# Patient Record
Sex: MALE | Race: WHITE | NOT HISPANIC OR LATINO | Employment: OTHER | URBAN - METROPOLITAN AREA
[De-identification: names, ages, dates, MRNs, and addresses within clinical notes are randomized per-mention and may not be internally consistent; named-entity substitution may affect disease eponyms.]

---

## 2021-03-30 ENCOUNTER — OFFICE VISIT (OUTPATIENT)
Dept: OPHTHALMOLOGY | Facility: CLINIC | Age: 61
End: 2021-03-30
Payer: COMMERCIAL

## 2021-03-30 VITALS — BODY MASS INDEX: 21.7 KG/M2 | HEIGHT: 71 IN | WEIGHT: 155 LBS

## 2021-03-30 DIAGNOSIS — H35.89 RETINAL MACULAR ATROPHY: Primary | ICD-10-CM

## 2021-03-30 DIAGNOSIS — H25.093 AGE-RELATED INCIPIENT CATARACT OF BOTH EYES: ICD-10-CM

## 2021-03-30 PROCEDURE — 92004 COMPRE OPH EXAM NEW PT 1/>: CPT | Performed by: OPTOMETRIST

## 2021-03-30 PROCEDURE — 92134 CPTRZ OPH DX IMG PST SGM RTA: CPT | Performed by: OPTOMETRIST

## 2021-03-30 ASSESSMENT — REFRACTION_WEARINGRX
OS_AXIS: 173
SPECS_TYPE: PAL
OD_AXIS: 173
OS_SPHERE: -5.75
OD_ADD: +2.25
OS_ADD: +2.25
OD_CYLINDER: +1.00
OS_CYLINDER: +1.00
OD_SPHERE: -5.25

## 2021-03-30 ASSESSMENT — REFRACTION_MANIFEST
OD_ADD: +2.50
OD_CYLINDER: +1.25
OS_SPHERE: -5.25
OD_SPHERE: -5.00
OS_AXIS: 175
OD_AXIS: 172
OS_CYLINDER: +1.25
OS_ADD: +2.50

## 2021-03-30 ASSESSMENT — CONF VISUAL FIELD
METHOD: COUNTING FINGERS
OD_NORMAL: 1
OS_NORMAL: 1

## 2021-03-30 ASSESSMENT — VISUAL ACUITY
OD_CC+: -1
OD_CC: J7
OS_CC: J7
OS_CC+: -1
CORRECTION_TYPE: GLASSES
OD_CC: 20/30
METHOD: SNELLEN - LINEAR
OS_CC: 20/40

## 2021-03-30 ASSESSMENT — TONOMETRY
IOP_METHOD: TONOPEN
OD_IOP_MMHG: 15
OS_IOP_MMHG: 16

## 2021-03-30 ASSESSMENT — EXTERNAL EXAM - RIGHT EYE: OD_EXAM: NORMAL

## 2021-03-30 ASSESSMENT — EXTERNAL EXAM - LEFT EYE: OS_EXAM: NORMAL

## 2021-03-30 ASSESSMENT — SLIT LAMP EXAM - LIDS
COMMENTS: NORMAL
COMMENTS: NORMAL

## 2021-03-30 ASSESSMENT — MIFFLIN-ST. JEOR: SCORE: 1535.21

## 2021-03-30 ASSESSMENT — CUP TO DISC RATIO
OS_RATIO: 0.5
OD_RATIO: 0.6

## 2021-03-30 NOTE — PROGRESS NOTES
History  HPI     Annual Eye Exam     In both eyes.  Associated symptoms include tearing.  Negative for glare, haloes and eye pain.  Treatments tried include no treatments.  Pain was noted as 0/10. Additional comments: Vision decline with each eye.               Comments     The past 2 year vision has declined with each eye. Had an exam 2 week ago and a Cataract Evaluation was recommended. Patient states is having having a hard time night driving not due to glare or light it just due to the dim vision with each eye. Tearing with left eye > right eye the past year with no pattern.     Alana Billy, COT COT 2:16 PM March 30, 2021     The patient was seen by Dr. Lee at [facility] on 3/18/2021. He was told he had cataracts, but the doctor was not sure that these were limiting his vision.            Last edited by Iker Millan, OD on 3/30/2021  3:05 PM. (History)          Assessment/Plan  (H35.89) Retinal macular atrophy  (primary encounter diagnosis)  Comment:    Reporting decreased acuity and dim vision especially at night, denies significant glare   Mild cataracts on examination   Diffuse retinal thinning on RNFL OCT  Plan: OCT Retina Spectralis OU (both eyes)         Educated patient and wife on clinical findings. Referred to Dr. Stokes for retina consultation. Given unknown etiology, explained that prognosis at this time is unknown.    (H25.093) Age-related incipient cataract of both eyes  Comment: Not visually significant  Plan:  Explained that cataract surgery would not be beneficial at this time.    Complete documentation of historical and exam elements from today's encounter can  be found in the full encounter summary report (not reduplicated in this progress  note). I personally obtained the chief complaint(s) and history of present illness. I  confirmed and edited as necessary the review of systems, past medical/surgical  history, family history, social history, and examination findings as  documented by  others; and I examined the patient myself. I personally reviewed the relevant tests,  images, and reports as documented above. I formulated and edited as necessary the  assessment and plan and discussed the findings and management plan with the  patient and family.    Iker Millan OD, FAAO

## 2021-03-30 NOTE — NURSING NOTE
Chief Complaints and History of Present Illnesses   Patient presents with     Annual Eye Exam     Vision decline with each eye.      Chief Complaint(s) and History of Present Illness(es)     Annual Eye Exam     Laterality: both eyes    Associated symptoms: tearing.  Negative for glare, haloes and eye pain    Treatments tried: no treatments    Pain scale: 0/10    Comments: Vision decline with each eye.               Comments     The past 2 year vision has declined with each eye. Had an exam 2 week ago and a Cataract Evaluation was recommended. Patient states is having having a hard time night driving not due to glare or light it just due to the dim vision with each eye. Tearing with left eye > right eye the past year with no pattern.     Alana Billy, COT COT 2:16 PM March 30, 2021                   \

## 2021-04-05 DIAGNOSIS — H35.89 RETINAL MACULAR ATROPHY: Primary | ICD-10-CM

## 2021-04-13 ENCOUNTER — OFFICE VISIT (OUTPATIENT)
Dept: OPHTHALMOLOGY | Facility: CLINIC | Age: 61
End: 2021-04-13
Attending: OPHTHALMOLOGY
Payer: COMMERCIAL

## 2021-04-13 DIAGNOSIS — H35.89 RETINAL MACULAR ATROPHY: Primary | ICD-10-CM

## 2021-04-13 DIAGNOSIS — H35.89 RETINAL MACULAR ATROPHY: ICD-10-CM

## 2021-04-13 DIAGNOSIS — H35.50 RETINAL DYSTROPHY: Primary | ICD-10-CM

## 2021-04-13 PROCEDURE — 92004 COMPRE OPH EXAM NEW PT 1/>: CPT | Mod: GC | Performed by: OPHTHALMOLOGY

## 2021-04-13 PROCEDURE — 99207 FUNDUS AUTOFLUORESCENCE IMAGE (FAF) OU (BOTH EYES): CPT | Performed by: OPHTHALMOLOGY

## 2021-04-13 PROCEDURE — G0463 HOSPITAL OUTPT CLINIC VISIT: HCPCS

## 2021-04-13 PROCEDURE — 92133 CPTRZD OPH DX IMG PST SGM ON: CPT | Performed by: OPHTHALMOLOGY

## 2021-04-13 PROCEDURE — 92250 FUNDUS PHOTOGRAPHY W/I&R: CPT | Performed by: OPHTHALMOLOGY

## 2021-04-13 PROCEDURE — 92134 CPTRZ OPH DX IMG PST SGM RTA: CPT | Performed by: OPHTHALMOLOGY

## 2021-04-13 SDOH — HEALTH STABILITY: MENTAL HEALTH: HOW OFTEN DO YOU HAVE A DRINK CONTAINING ALCOHOL?: NEVER

## 2021-04-13 ASSESSMENT — VISUAL ACUITY
OD_CC: 20/25
OD_CC+: -2
METHOD: SNELLEN - LINEAR
OS_CC: 20/40

## 2021-04-13 ASSESSMENT — REFRACTION_WEARINGRX
SPECS_TYPE: PAL
OS_ADD: +2.25
OD_CYLINDER: +1.00
OD_AXIS: 173
OD_SPHERE: -5.25
OD_ADD: +2.25
OS_CYLINDER: +1.00
OS_AXIS: 173
OS_SPHERE: -5.75

## 2021-04-13 ASSESSMENT — EXTERNAL EXAM - RIGHT EYE: OD_EXAM: NORMAL

## 2021-04-13 ASSESSMENT — TONOMETRY
OS_IOP_MMHG: 16
IOP_METHOD: TONOPEN
OD_IOP_MMHG: 15

## 2021-04-13 ASSESSMENT — CONF VISUAL FIELD
METHOD: COUNTING FINGERS
OS_NORMAL: 1
OD_NORMAL: 1

## 2021-04-13 ASSESSMENT — CUP TO DISC RATIO
OD_RATIO: 0.5
OS_RATIO: 0.5

## 2021-04-13 ASSESSMENT — SLIT LAMP EXAM - LIDS
COMMENTS: NORMAL
COMMENTS: NORMAL

## 2021-04-13 ASSESSMENT — EXTERNAL EXAM - LEFT EYE: OS_EXAM: NORMAL

## 2021-04-13 NOTE — NURSING NOTE
Chief Complaints and History of Present Illnesses   Patient presents with     Retinal Evaluation     Chief Complaint(s) and History of Present Illness(es)     Retinal Evaluation     Laterality: both eyes    Associated symptoms: floaters, flashes, discharge and tearing (Left eye).  Negative for eye pain, dryness, itching and foreign body sensation    Pain scale: 0/10              Comments     Cecil is here referred by Dr Millan for a retinal evaluation. He says he has had floaters for several years, but recently has been having some flashing lights as well.   At night he has trouble seeing anything.     Kendall Jensen COT 2:17 PM April 13, 2021

## 2021-04-13 NOTE — PROGRESS NOTES
CC -   Retinal macular atrophy    INTERVAL HISTORY - Initial visit.     PMH -   Cecil Haddad is a  60 year old year-old patient referred by Dr Millan for evaluation and treat of retinal macular atrophy.  Noticed ~ 2017 objects were grey or reddish tint, poor color vision, TV images seem washed out  Progressively worsening, trouble seeing with bright sunlight, also trouble in dark.  No peripheral vision problems noticed    Occasional flashes only since ~ 9/2020, has floaters lifelong no changes    No family history of vision loss. Smokes 1 PPD and uses marijuana. Last had alcohol about 12 years ago   Patient takes vitamin supplements  Has had renal CA Tx with resection     No family history known but limited family      PAST OCULAR SURGERY  None    RETINAL IMAGING:  Mac OCT  4-13-21  OD - loss of photoreceptor, PHF attached, thin choroid  OS - oss of photoreceptor, PHF attached, thin choroid    AF 4-13-21  right eye- loss of hypoAF in the fundus  left eye- loss of hypoAF in the fundus      OCT RNFL  4/13/21  OU - normal      ASSESSMENT & PLAN    #  Retinal dystrophy   - most likely etiology of symptoms   - cannot r/o CAR/AIR   - will check ffERG/GVF first    -  NS OU   - mild      # syneresis OU   - advised S/Sx RD 4/2021      Colton Chavez MD  Ophthalmology Resident, PGY-3     return to clinic: 3 months, OCT OU, DFE OU    ATTESTATION     Attending Attestation:     Complete documentation of historical and exam elements from today's encounter can be found in the full encounter summary report (not reduplicated in this progress note).  I personally obtained the chief complaint(s) and history of present illness.  I confirmed and edited as necessary the review of systems, past medical/surgical history, family history, social history, and examination findings as documented by others; and I examined the patient myself.  I personally reviewed the relevant tests, images, and reports as documented above.  I personally  reviewed the ophthalmic test(s) associated with this encounter, agree with the interpretation(s) as documented by the resident/fellow, and have edited the corresponding report(s) as necessary.   I formulated and edited as necessary the assessment and plan and discussed the findings and management plan with the patient and family    Johana Stokes MD, PhD  , Vitreoretinal Surgery  Department of Ophthalmology  [facility]

## 2021-07-05 ENCOUNTER — ALLIED HEALTH/NURSE VISIT (OUTPATIENT)
Dept: OPHTHALMOLOGY | Facility: CLINIC | Age: 61
End: 2021-07-05
Attending: OPHTHALMOLOGY
Payer: COMMERCIAL

## 2021-07-05 DIAGNOSIS — H35.89 RETINAL MACULAR ATROPHY: ICD-10-CM

## 2021-07-05 DIAGNOSIS — H35.50 RETINAL DYSTROPHY: ICD-10-CM

## 2021-07-05 PROCEDURE — 999N000103 HC STATISTIC NO CHARGE FACILITY FEE

## 2021-07-05 ASSESSMENT — VISUAL ACUITY
OD_CC: 20/30
OS_CC: 20/40
OS_CC+: -
CORRECTION_TYPE: GLASSES
METHOD: SNELLEN - LINEAR
OD_CC+: -1

## 2021-07-05 ASSESSMENT — REFRACTION_WEARINGRX
OD_CYLINDER: +1.00
OS_AXIS: 173
OD_AXIS: 173
OS_CYLINDER: +1.00
OS_ADD: +2.25
OD_SPHERE: -5.25
OD_ADD: +2.25
OS_SPHERE: -5.75
SPECS_TYPE: PAL

## 2021-07-05 NOTE — NURSING NOTE
"Returns for GVF+ffERG.  Referred by Dr. Millan to Dr. Stokes for eval of retinal macular atrophy. H/O renal CA tx w/resection.  Last eye exam 04-13-21:  retinal dystrophy, most likely etiology of symptoms, cannot rule out CAR/AIR.  Pt states he is an  ([employer] service, sometimes [place] of [place]  Decreased night vision x1-2yrs and needs increased lighting to see.  Now also w/decresed color vision; used to see colors well but tv looks black/white.  States traffic lights look the same but overall vision is \"off\".  +Light sensitivity.  Scheduled to return to Retina 07-26; will await results.  "

## 2021-07-05 NOTE — PROGRESS NOTES
STANDARD ERG REPORT    Referring: Kike    RE:  Cecil Haddad  MRN:  [id number]  :  1960    ERG Date:  2021      DIAGNOSTIC INDICATION:  The patient is a 60 year old man with suspected retinal dystrophy.  He noticed decreased color vision and trouble in bright sunlight since about , with progressive worsening.  He has not noticed any peripheral vision problems.  The patient has a history of renal cancer treated with resection.    DIAGNOSTIC FINDINGS:    A full field ERG was obtained in both eye using DTL electrodes and ISCEV standards.  Per the technician, the recording was essentially reliable in both eyes.  The results showed severe attenuation in both eyes as detailed below     Under scotopic conditions, both eyes showed severe attenuation.  In particular, the dim white flash response was flat in both eyes and could not be measured.  The bright 3.0 flash showed severe attenuation of both the A and B waves, to less than 10% of normal minimum amplitude, and a moderate 2.7 ms delay of the B-wave.  The bright 10.0 flash was likewise severely attenuated, though in this case the A and B-waves were reduced to less than 20% of the normal minimum amplitude.  Both the A and B waves were delayed.  Oscillatory potentials were visible but markedly attenuated.     Under photopic conditions, both eyes showed moderate attenuation.  The 30Hz flicker showed amplitudes of 30 of normal for the right eye and 50% of normal for the left eye, with delays of 0.6 ms for the right eye and 2.8 ms for the left eye.  The single photopic flash showed similar attenuation and delay, with normal morphology.  The 30Hz flicker is more reliable for numerical measurements of the photopic response.  The RETeval response was also measured, showing moderate reduction and delay of the photopic response, though the RETeval showed more symmetry between the eyes.                         Visual Acuity Right Eye : 20/30-1, NI        w/gls, -5.25 + 1.00x173    Visual Acuity Left Eye : 20/40-, PHNI      w/gls, -5.75 + 1.00x173                                               ALL AVERAGED                   Data for Full-Field ERG  Right Eye  Left Eye   DARK-ADAPTED Patient Normal Patient   0.01 ERG (nicholas) b-wave amplitude ( v) ---- 69.6 to 348.2 ----   0.01 ERG (nicholas) b-wave implicit time (ms) ---- 78.2 to 117.2 ----         3.0 ERG (combined) a-wave amplitude ( v) -13 -259.4 to -98 -11   3.0 ERG (combined) a-wave implicit time (ms) 20 11.5 to 20.3 19.4   3.0 ERG (combined) b-wave amplitude ( v) 24.5 159.2 to 421.6 21   3.0 ERG (combined) b-wave implicit time (ms) 59.9 41.2 to 57.2 59.9         10.0 ERG (brighter combined) a-wave amplitude ( v) -18 -291 to -110 -15   10.0 ERG (brighter combined) a-wave implicit time (ms) 17.2 9.9 to 15.1 16.6   10.0 ERG (brighter combined) b-wave amplitude ( v) 35 191.5 to 403.1 28   10.0 ERG (brighter combined) b-wave implicit time (ms) 64.9 39.1 to 55.3 61.6         3.0 Oscillatory Potentials  present    LIGHT-ADAPTED       3.0 Flicker (30Hz) amplitude ( v) 26(29) 56.4 to 151.2 17(27)   3.0 Flicker (30Hz) implicit time (ms) 32.2(132) 21.8 to 31.6 34.4(99.8         3.0 ERG (cone) a-wave amplitude ( v) -6 -45.1 to -13.7 -6   3.0 ERG (cone) a-wave implicit time (ms) 18.9 11.4 to 16.8 18.9   3.0 ERG (cone) b-wave amplitude ( v) 19 62.4 to 174.2 18   3.0 ERG (cone) b-wave implicit time (ms) 36.1 26.8 to 34.2 37.2   * = manipulated cursors  parentheses = cursors at selected peaks  ---- = residual to non-measurable  xxxx = not tested          STANDARD RETeval ERG REPORT,  ISCEV Photopic Flash/Flicker and PhNR   -- RETeval w/Sensor Strip = 1/3 Espion3 w/DTL                                                                     ALL AVERAGED  Data for Full-Field ERG Right Eye   Left Eye    Dark-Adapted Patient Normal  Patient   0.01 ERG (nicholas) amplitude( v) xxxx 21 to 79 xxxx   0.01 ERG (nicholas) implicit time(ms) xxxx 68 to 103  xxxx   MMMMM      3.0 ERG (combined) a-wave amplitude( v) xxxx -62 to -22 xxxx   3.0 ERG (combined) a-wave implicit time(ms) xxxx 13 to 18 xxxx   3.0 ERG (combined) b-wave amplitude( v) xxxx 42 to 86 xxxx   3.0 ERG (combined) b-wave implicit time(ms) xxxx 35 to 52 xxxx   MMMMM      10.0 ERG (brighter) a-wave amplitude( v) xxxx -77 to -31 xxxx   10.0 ERG (brighter) a-wave implicit time(ms) xxxx 10 to 13 xxxx   10.0 ERG (brighter) b-wave amplitude( v) xxxx 54 to 95 xxxx   10.0 ERG (brighter) b-wave implicit time(ms) xxxx 35 to 53 xxxx         3.0 Oscillatory Potentials xxxx present xxxx    Light-Adapted       3.0 Flicker (30-Hz) amplitude( v) 8.9 16.1 to 53.1 8.8   3.0 Flicker (30-Hz) implicit time(ms) 32.1 24.6 to 29.4 31.9         3.0 ERG (cone) a-wave amplitude( v) -2.1 -16.0 to -2.1 -3.1   3.0 ERG (cone) a-wave implicit time(ms) 15.2 10.5 to 14.7 17.8   3.0 ERG (cone) b-wave amplitude( v) 7.5 16.5 to 64.1 7.6   3.0 ERG (cone) b-wave implicit time(ms) 33.8 27.3 to 32.5 35.4                                   ---- = residual to non-measurable                                xxxx = not tested                    Normative values per  Evaluation of light- and dark-adapted ERGs using a mydriasis-free,                                     portable system: clinical classifications and normative data  by H Navin, X Dorian, S Areli, SN Harris                                          M Kary, BOBBI Kim, GRACIE Toledo ,BOBBI Masterson, SARAH Brown,   Ophthal[employer],                                          [facility], [facility]. [email or web address]                   Normative values per [facility] data per individual age at time of testing, cd (dilated) and Td (undilated).                                        INTERPRETATION:         1. Abnormal ffERG both eyes.  The ffERG shows severe reduction of the nicholas response in both eyes, with moderate reduction of the cone response.   Interestingly, the patients symptoms are more prominent in photopic conditions, and his GVF does not show any perpheral vision loss.  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  The findings are less typical for an autoimmune or cancer-associated retinopathy.    2. Genetic testing is recommended and referral to Dr. Gallego.  A test for CAR could be considered as a precaution.      Sincerely,     Johana Stokes MD, PhD

## 2021-07-26 DIAGNOSIS — H35.50 RETINAL DYSTROPHY: Primary | ICD-10-CM

## 2021-08-03 ENCOUNTER — OFFICE VISIT (OUTPATIENT)
Dept: OPHTHALMOLOGY | Facility: CLINIC | Age: 61
End: 2021-08-03
Attending: OPHTHALMOLOGY
Payer: COMMERCIAL

## 2021-08-03 DIAGNOSIS — H35.50 RETINAL DYSTROPHY: ICD-10-CM

## 2021-08-03 PROCEDURE — 92134 CPTRZ OPH DX IMG PST SGM RTA: CPT | Performed by: OPHTHALMOLOGY

## 2021-08-03 PROCEDURE — 99213 OFFICE O/P EST LOW 20 MIN: CPT | Performed by: OPHTHALMOLOGY

## 2021-08-03 PROCEDURE — G0463 HOSPITAL OUTPT CLINIC VISIT: HCPCS

## 2021-08-03 ASSESSMENT — EXTERNAL EXAM - LEFT EYE: OS_EXAM: NORMAL

## 2021-08-03 ASSESSMENT — EXTERNAL EXAM - RIGHT EYE: OD_EXAM: NORMAL

## 2021-08-03 ASSESSMENT — REFRACTION_WEARINGRX
OD_CYLINDER: +1.00
OD_AXIS: 173
OD_ADD: +2.25
OS_CYLINDER: +1.00
SPECS_TYPE: PAL
OD_SPHERE: -5.25
OS_AXIS: 173
OS_SPHERE: -5.75
OS_ADD: +2.25

## 2021-08-03 ASSESSMENT — CONF VISUAL FIELD
OS_NORMAL: 1
OD_NORMAL: 1

## 2021-08-03 ASSESSMENT — VISUAL ACUITY
OD_CC+: -1
OD_CC: 20/25
METHOD: SNELLEN - LINEAR
OS_CC: 20/30
CORRECTION_TYPE: GLASSES

## 2021-08-03 ASSESSMENT — TONOMETRY
OD_IOP_MMHG: 13
IOP_METHOD: ICARE
OS_IOP_MMHG: 13

## 2021-08-03 ASSESSMENT — SLIT LAMP EXAM - LIDS
COMMENTS: NORMAL
COMMENTS: NORMAL

## 2021-08-03 NOTE — PROGRESS NOTES
CC -   Retinal macular atrophy    INTERVAL HISTORY -  No change since SAIMA with me    PMH -   Cecil Haddad is a  61 year old year-old patient referred by Dr Millan for evaluation and treat of retinal macular atrophy.  Noticed ~ 2017 objects were grey or reddish tint, poor color vision, TV images seem washed out  Progressively worsening, trouble seeing with bright sunlight, also trouble in dark.  No peripheral vision problems noticed    Occasional flashes only since ~ 9/2020, has floaters lifelong no changes    No family history of vision loss. Smokes 1 PPD and uses marijuana. Last had alcohol about 12 years ago   Patient takes vitamin supplements  Has had renal CA Tx with resection     No family history known but limited family      PAST OCULAR SURGERY  None    RETINAL IMAGING:  OCT  8-3-21  OD - loss of photoreceptor, PHF attached, thin choroid - stable  OS - oss of photoreceptor, PHF attached, thin choroid - stable    AF 4-13-21  OD - loss of hypoAF in the fundus  OS - loss of hypoAF in the fundus      OCT RNFL  4/13/21  OU - normal    GVF 7-5-21  OU - normal peripheral isopters    ffERG  7-5-21  OU - mod/severely attenuated nicholas/cone response with mild increased implicit times    ASSESSMENT & PLAN    #  Retinal dystrophy   - most likely etiology of symptoms   - cannot r/o CAR/AIR   - ffERG & GVF on 7/5/21 more c/w dystrophy     - refer to [facility]   - may benefit from tinted eyeglass lenses   - will see technician for evaluation     - will check anti-recoverin as precaution given history of renal CA      -  NS OU   - mild      # syneresis OU   - advised S/Sx RD 4/2021      return to clinic:  [place] next available        ATTESTATION     Attending Attestation:     Complete documentation of historical and exam elements from today's encounter can be found in the full encounter summary report (not reduplicated in this progress note).  I personally obtained the chief complaint(s) and history of present illness.  I  confirmed and edited as necessary the review of systems, past medical/surgical history, family history, social history, and examination findings as documented by others; and I examined the patient myself.  I personally reviewed the relevant tests, images, and reports as documented above.  I formulated and edited as necessary the assessment and plan and discussed the findings and management plan with the patient and family    Johana Stokes MD, PhD  , Vitreoretinal Surgery  Department of Ophthalmology  [facility]

## 2021-08-03 NOTE — NURSING NOTE
Chief Complaints and History of Present Illnesses   Patient presents with     Retinal Dystrophy Hereditary Follow Up     Chief Complaint(s) and History of Present Illness(es)     Retinal Dystrophy Hereditary Follow Up     Laterality: both eyes    Onset: 3 months ago              Comments     Pt. States that VA continues to worsen BE. No pain or dryness BE. No flashes BE, occasional floaters BE.   Gail Hurtado COT 9:42 AM August 3, 2021

## 2021-08-10 ENCOUNTER — LAB (OUTPATIENT)
Dept: LAB | Facility: CLINIC | Age: 61
End: 2021-08-10
Payer: COMMERCIAL

## 2021-08-10 DIAGNOSIS — H35.89 RETINAL MACULAR ATROPHY: ICD-10-CM

## 2021-08-10 DIAGNOSIS — H35.50 RETINAL DYSTROPHY: ICD-10-CM

## 2021-09-13 ENCOUNTER — OFFICE VISIT (OUTPATIENT)
Dept: OPHTHALMOLOGY | Facility: CLINIC | Age: 61
End: 2021-09-13
Attending: GENETIC COUNSELOR, MS
Payer: COMMERCIAL

## 2021-09-13 ENCOUNTER — OFFICE VISIT (OUTPATIENT)
Dept: OPHTHALMOLOGY | Facility: CLINIC | Age: 61
End: 2021-09-13
Attending: OPHTHALMOLOGY
Payer: COMMERCIAL

## 2021-09-13 DIAGNOSIS — H35.50 RETINAL DYSTROPHY: Primary | ICD-10-CM

## 2021-09-13 DIAGNOSIS — Z71.83 ENCOUNTER FOR NONPROCREATIVE GENETIC COUNSELING: ICD-10-CM

## 2021-09-13 PROCEDURE — 92235 FLUORESCEIN ANGRPH MLTIFRAME: CPT | Performed by: OPHTHALMOLOGY

## 2021-09-13 PROCEDURE — 999N000103 HC STATISTIC NO CHARGE FACILITY FEE

## 2021-09-13 PROCEDURE — 92014 COMPRE OPH EXAM EST PT 1/>: CPT | Mod: GC | Performed by: OPHTHALMOLOGY

## 2021-09-13 PROCEDURE — 99207 PR NO BILLABLE SERVICE THIS VISIT: CPT | Performed by: OPHTHALMOLOGY

## 2021-09-13 PROCEDURE — G0463 HOSPITAL OUTPT CLINIC VISIT: HCPCS

## 2021-09-13 PROCEDURE — 96040 HC GENETIC COUNSELING, EACH 30 MINUTES: CPT | Performed by: GENETIC COUNSELOR, MS

## 2021-09-13 ASSESSMENT — SLIT LAMP EXAM - LIDS
COMMENTS: NORMAL
COMMENTS: NORMAL

## 2021-09-13 ASSESSMENT — REFRACTION_WEARINGRX
OD_ADD: +2.25
OS_AXIS: 173
OD_AXIS: 173
OD_CYLINDER: +1.00
OS_CYLINDER: +1.00
OD_SPHERE: -5.25
OS_ADD: +2.25
OS_SPHERE: -5.75
SPECS_TYPE: PAL

## 2021-09-13 ASSESSMENT — CUP TO DISC RATIO
OS_RATIO: 0.4
OD_RATIO: 0.4

## 2021-09-13 ASSESSMENT — VISUAL ACUITY
CORRECTION_TYPE: GLASSES
OS_CC: 20/40
OD_CC: 20/25
OD_CC+: +2
METHOD: SNELLEN - LINEAR

## 2021-09-13 ASSESSMENT — EXTERNAL EXAM - LEFT EYE: OS_EXAM: NORMAL

## 2021-09-13 ASSESSMENT — EXTERNAL EXAM - RIGHT EYE: OD_EXAM: NORMAL

## 2021-09-13 ASSESSMENT — CONF VISUAL FIELD
OS_NORMAL: 1
OD_NORMAL: 1
METHOD: COUNTING FINGERS

## 2021-09-13 ASSESSMENT — TONOMETRY
OD_IOP_MMHG: 14
OS_IOP_MMHG: 15
IOP_METHOD: TONOPEN

## 2021-09-13 NOTE — PROGRESS NOTES
CC -   Retinal Dystrophy Evaluation     INTERVAL HISTORY -  Patient's last visit with Dr. Stokes was 8/3/2021 - At that juncture      [facility] -   Cecil Haddad is a  61 year old year-old patient originally referred by Dr Millan for evaluation and treat of retinal macular atrophy.    Patient reports that starting in 2017 he reports that things are grey and cloudy during the day. Difficulty seeing at night, with everything being dark. He has very difficulty seeing especially in the dark. Also has trouble seeing in normal lighting condition and his home is very brightly lit.     He reports that he first noticed this about 3 years ago when he started getting a red tinged visual field. He reports that is has progressed since then and is getting worse at this time. He does report that it depends on the day in terms of his symptoms. He reports kerry days are especially bad with his visual field being washed out.      He reports that symptoms are localized in center of his vision, with peripheral fields preserved. No other symptoms. No headaches. He does report hearing difficulties (ongoing for a long while - working around loud machinery / white noise / static). No FHx of visual changes.     Has had occasional flashes of lights in his eyes - usually around peripheral of eyes 1-2 x/month - since 9/2020. Has floaters life long - not increased.     No family history of vision loss. Smokes 1 PPD and uses marijuana. Last had alcohol about 12 years ago    Patient takes vitamin supplements - Multipurpose vitamins 2-3x/week.     Has had renal cancer tx with resection. History of kidney cancer - diagnosed in 2004 s/p resection. Last followed 16-17 years ago. No recent hx of bloody urine.      History of pancreatitis     No family history known but limited family members. (Has a sister - doesn't know about Hx; Did not know Father. Mother worse glasses although no hx of glasses usage).     PAST OCULAR SURGERY: None    RETINAL  IMAGIN/13/21  fluorescein angiography: 21   Normal AV and choroidal filling. Mild peripheral vascular leakage and vascular attenuation. Non specific. No clear vasculitis and possible window's defects. No late optic nerve leakage    OCT  8-3-21:  OD - loss of photoreceptor, PHF attached, thin choroid - stable  OS - oss of photoreceptor, PHF attached, thin choroid - stable    AF 21:  OD - loss of hypoAF in the fundus  OS - loss of hypoAF in the fundus    OCT RNFL 21:  OU - normal    GVF 21:  OU - normal peripheral isopters    ffERG  21:  OU - mod/severely attenuated nicholas/cone response with mild increased implicit times    ASSESSMENT & PLAN    #  Retinal dystrophy   - most likely etiology of symptoms. However, appeared late presentation and with significant photopsias   - cannot r/o CAR/AIR. H/o renal cell carcinoma; status post kidney removal    - ffERG & GVF on 21 c/w nicholas-cone dystrophy   - Genetic counseling evaluation today for invitae testing   - will check  anti-recoverin (previously ordered, but not done yet) FTA ABS, TB quantiferon and ANAS   - history of smoking- recommend to stop   #  NS OU   - mild    # syneresis OU   - advised S/Sx RD 2021    RTC: 1- 2 months with labs    Travon Oseguera MD - PGY3  Department of Ophthalmology  Pager: [phone]    ~~~~~~~~~~~~~~~~~~~~~~~~~~~~~~~~~~   Complete documentation of historical and exam elements from today's encounter can be found in the full encounter summary report (not reduplicated in this progress note).  I personally obtained the chief complaint(s) and history of present illness.  I confirmed and edited as necessary the review of systems, past medical/surgical history, family history, social history, and examination findings as documented by others; and I examined the patient myself.  I personally reviewed the relevant tests, images, and reports as documented above.  I personally reviewed the ophthalmic test(s)  associated with this encounter, agree with the interpretation(s) as documented by the resident/fellow, and have edited the corresponding report(s) as necessary.   I formulated and edited as necessary the assessment and plan and discussed the findings and management plan with the patient and family    Kim Gallego MD   of Ophthalmology.  Retina Service   Department of Ophthalmology and Visual Neurosciences   [facility]  Phone: [phone]   Fax: [phone]

## 2021-09-13 NOTE — NURSING NOTE
Chief Complaints and History of Present Illnesses   Patient presents with     Retinal Dystrophy Hereditary Evaluation     Chief Complaint(s) and History of Present Illness(es)     Retinal Dystrophy Hereditary Evaluation               Comments     Pt states vision is the same as last visit. No eye pain today.  Pt still seeing flashes and floaters in both eyes, nothing new.  No redness or dryness.    Gio Aguilar [facility] September 13, 2021 2:09 PM

## 2021-09-13 NOTE — LETTER
9/13/2021       RE: Cecil Haddad  [address]  [facility] [address]     Dear Colleague,    Thank you for referring your patient, Cecil Haddad, to the [facility] at [facility]. Please see a copy of my visit note below.    CC -   Retinal Dystrophy Evaluation     INTERVAL HISTORY -  Patient's last visit with Dr. Stokes was 8/3/2021 - At that juncture      [facility] -   Cecil Haddad is a  61 year old year-old patient originally referred by Dr Millan for evaluation and treat of retinal macular atrophy.    Patient reports that starting in 2017 he reports that things are grey and cloudy during the day. Difficulty seeing at night, with everything being dark. He has very difficulty seeing especially in the dark. Also has trouble seeing in normal lighting condition and his home is very brightly lit.     He reports that he first noticed this about 3 years ago when he started getting a red tinged visual field. He reports that is has progressed since then and is getting worse at this time. He does report that it depends on the day in terms of his symptoms. He reports kerry days are especially bad with his visual field being washed out.      He reports that symptoms are localized in center of his vision, with peripheral fields preserved. No other symptoms. No headaches. He does report hearing difficulties (ongoing for a long while - working around loud machinery / white noise / static). No FHx of visual changes.     Has had occasional flashes of lights in his eyes - usually around peripheral of eyes 1-2 x/month - since 9/2020. Has floaters life long - not increased.     No family history of vision loss. Smokes 1 PPD and uses marijuana. Last had alcohol about 12 years ago    Patient takes vitamin supplements - Multipurpose vitamins 2-3x/week.     Has had renal cancer tx with resection. History of kidney cancer - diagnosed in 2004 s/p resection. Last  followed 16-17 years ago. No recent hx of bloody urine.      History of pancreatitis     No family history known but limited family members. (Has a sister - doesn't know about Hx; Did not know Father. Mother worse glasses although no hx of glasses usage).     PAST OCULAR SURGERY: None    RETINAL IMAGIN/13/21  fluorescein angiography: 21   Normal AV and choroidal filling. Mild peripheral vascular leakage and vascular attenuation. Non specific. No clear vasculitis and possible window's defects. No late optic nerve leakage    OCT  8-3-21:  OD - loss of photoreceptor, PHF attached, thin choroid - stable  OS - oss of photoreceptor, PHF attached, thin choroid - stable    AF 21:  OD - loss of hypoAF in the fundus  OS - loss of hypoAF in the fundus    OCT RNFL 21:  OU - normal    GVF 21:  OU - normal peripheral isopters    ffERG  21:  OU - mod/severely attenuated nicholas/cone response with mild increased implicit times    ASSESSMENT & PLAN    #  Retinal dystrophy   - most likely etiology of symptoms. However, appeared late presentation and with significant photopsias   - cannot r/o CAR/AIR. 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  I personally obtained the chief complaint(s) and history of present illness.  I confirmed and edited as necessary the review  of systems, past medical/surgical history, family history, social history, and examination findings as documented by others; and I examined the patient myself.  I personally reviewed the relevant tests, images, and reports as documented above.  I personally reviewed the ophthalmic test(s) associated with this encounter, agree with the interpretation(s) as documented by the resident/fellow, and have edited the corresponding report(s) as necessary.   I formulated and edited as necessary the assessment and plan and discussed the findings and management plan with the patient and family. Kim Gallego MD      Again, thank you for allowing me to participate in the care of your patient.      Sincerely,    Kim Gallego M.D.   of Ophthalmology  Vitreoretinal Surgeon  [facility] Endowed Chair  Department of Ophthalmology & Visual Neurosciences  [facility]  Phone:  [phone]   Fax:  [phone]

## 2021-10-12 NOTE — PROGRESS NOTES
Genetics Eye Clinic Genetic Counseling Note     Date of Visit: 21     Presenting Information: Cecil Haddad is a 61 year old year old male who was seen for genetic counseling at the request of Dr. Gallego, because Cecil's previous eye exam findings were suggestive a diagnosis of retinal dystrophy.  Genetic counseling was requested to obtain family history, to discuss the genetic details of inherited retinal disorders, and to coordinate genetic testing.     Cecil first started noticing changes in his vision about . First, he noticed changes with his color vision diminishing followed by trouble seeing in bright sunlight and trouble in the dark. He does not report concerns with his peripheral vision. Cecil had an ERG 21 which was consistent with nicholas-cone dystrophy, though his symptoms are more consistent with a cone-nicholas dystrophy. Cecil last saw Dr. Stokes 8/3/21 for retinal macular atrophy and was referred to the IRD clinic for further evaluation and to discuss genetic testing.     Other notable history includes renal cancer diagnosed in  s/p resection. Cecil reports no other major health concerns.     Please refer to Dr. Gallego's note from today for further details of Cecil's eye exam and evaluation from today.      Family History:  A three generation pedigree was obtained and scanned into the electronic medical record. The relevant portions are described below:       Children- none    Siblings- Pauly has a maternal half-sister in her 50s who is healthy and has no vision problems. She has a son who is healthy with no vision concerns.    Parents-Cecil's mother passed away due to old age and had no history of vision concerns. Cecil's father is , cause unknown.    Maternal Relatives- Cecil has one maternal aunt who is in her 80s and has no vision concerns. Her children are reported to be healthy. Cecil's maternal grandparents are .    Paternal Relatives-  "Information regarding paternal relatives is unknown.     Family history is otherwise largely non-contributory. Ancestry is reported to be , [place], and Tenriism. Consanguinity was denied.      Genetic Counseling Discussion:  We reviewed that our bodies are made of cells that contain our chromosomes which are made up of long stretches of DNA containing our genes. Our genes serve as the instructions for our bodies to grow and function. We have two copies of each gene, one inherited from our mother and one inherited from our father.     Retinal dystrophy is a broad category of eye conditions that are all associated with breakdown or degeneration of the retina.  Depending on the specific condition, this degeneration can affect the various different cells types of the retina, and therefore, the specific symptoms can vary significantly from one retinal dystrophy condition to another.  Most commonly affected areas of the retina include the photoreceptors (rods and cones) or the retinal pigment epithelium (RPE).  Many of these retinal dystrophy conditions are associated with progressive vision loss, but different types of retinal dystrophies exhibit variation in the speed of progression and degree of severity.  Additionally, some forms of retinal dystrophy are congenital, while others have a childhood or adult onset.  We discussed that many of the retinal dystrophies can present with overlapping symptoms and variable family histories, and so it is often difficult to categorize the specific form of retinal dystrophy in a particular individual without the use of genetic testing.    We talked about how some forms of retinal dystrophy are \"non-syndromic,\" meaning that the only symptoms associated with those genetic conditions are eye-related; examples of non-syndromic retinal dystrophy conditions include cone-nicholas dystrophy, retinitis pigmentosa, achromatopsia, and Toshia congenital amaurosis.  On the other hand, we " "reviewed that there are also \"syndromic\" forms of retinal dystrophy, in which there are associated additional medical/developmental issues, along with the eye symptoms.  Some examples of syndromic retinal dystrophies include Usher syndrome, Bardet Biedl syndrome, and Juliana syndrome.  Because of the variable onset and variable clinical presentation of these conditions, it can often be difficult to determine in a young child if they have a syndromic or non-syndromic form of retinal dystrophy.    We discussed that retinal dystrophies are genetic conditions that can show a variety of different inheritance patterns depending on the specific underlying gene involved for that affected individual.  Some retinal dystrophy conditions are associated with an autosomal recessive pattern of inheritance; we briefly reviewed the autosomal recessive pattern of inheritance and that these types of conditions are generally associated with a 25% recurrence risk for future children, regardless of the gender of the child.  We also talked about that some forms of retinal dystrophies show an X-linked recessive pattern of inheritance; we briefly reviewed the X-linked recessive pattern of inheritance and that these types of conditions are generally associated with a 50% recurrence risk for future male children. Other retinal dystrophies can have an autosomal dominant pattern of inheritance; we briefly reviewed the autosomal dominant pattern of inheritance and that the recurrence risk for future children in this situation depends on if the gene mutation was inherited from a parent or was new (\"de lawson\") in that child.  More rarely, retinal dystrophies can show a mitochondrial pattern of inheritance.     There are currently over 300 genes that have been associated with retinal dystrophy conditions.  Some of these genes have gene therapy treatment options, where individuals have been shown to have their vision loss reversed/improved.  Only " determination of the underlying genetic cause of retinal dystrophy (via genetic testing) will allow individuals to know if they are candidates for gene therapy.  We reviewed the availability of genetic testing via Next Generation Sequencing (NGS) for analysis of genes known to be associated with retinal dystrophy, with the aim of determining what condition is causing Cecil's eye symptoms.     We reviewed the availability of genetic testing via Next Generation Sequencing (NGS) for analysis of genes known to be associated with inherited retinal disorders, with the aim of determining what condition is causing Cecil's eye symptoms. We discussed the availability of a 322 gene panel through the [facility] which would go through [employer] and a sponsored 330 gene panel through [employer]. This test is performed free of charge because it is sponsored by [employer] and participation in this sponsored test gives [employer] access to the participant's genetic information and allows them to contact participants and their healthcare providers.    We went on to discuss the details, limitations, and possible outcomes of NGS. In particular, we discussed that there are three possible results from NGS:    Negative: meaning normal or no mutations are identified in the genes that were tested/sequenced    Positive: meaning a mutation that is known to be associated with a particular set of symptoms is identified    Variant of uncertain significance (VUS): meaning a change in the DNA sequence of a particular gene was seen but there is not enough information or data yet to know if it explains the symptoms. If a VUS is identified, testing of other relatives may be helpful to provide clarification.  In most cases, identification of a VUS does not confirm a diagnosis and does not result in any clinically actionable recommendations.    We discussed the  potential benefits of genetic testing and why this genetic testing is medically indicated. A positive result will help determine the etiology of the ocular abnormalities noted in Cecil and may guide the medical management for him, particularly if a syndromic cause of these ocular symptoms is found.  Additionally, for many of these genes, there is information available about expected prognosis or new treatment options.  Also, if a genetic cause is found for Cecil's ocular abnormalities, it will give us a more accurate risk assessment for other family members.      We also discussed the possibility that this test could turn up no definitive answers about the underlying cause of Cecil's ocular abnormalties.  We talked about that a negative genetic test would not rule out a genetic cause for Cecil's ocular abnormalities, as it is likely that not all the genes associated with retinal dystrophies and related ocular findings are currently known.       Next Generation Sequencing is a well established technology utilized by all molecular genetic labs throughout the country for identifying disease-causing mutations in various genes.  Next Generation Sequencing is currently the standard of care for genetic testing of single genes.  The recommended testing for Cecil  is DIAGNOSTIC testing, and it is NOT investigational.    Cecil consented for genetic testing today. He would like to see if insurance will cover this testing. We will submit information for a benefits investigation through [employer] and Cecil will be contacted regarding the potential cost of testing. If we proceed with testing, a buccal kit can be mailed to his home and I will call him with the results 3-4 weeks after the lab receives his sample.     It was a pleasure meeting Cecil today. He was encouraged to reach out to me if he has any further questions.     Plan:  1. BI for [employer] Inherited Retinal Disorders Panel    Geri Galindo MS,  LGC  Licensed Genetic Counselor  [facility]  Phone: [phone]  Fax: [phone]    Time spent in consultation face to face was approximately 20 minutes.

## 2021-10-15 ENCOUNTER — TELEPHONE (OUTPATIENT)
Dept: CONSULT | Facility: CLINIC | Age: 61
End: 2021-10-15

## 2021-10-15 NOTE — TELEPHONE ENCOUNTER
I left a voicemail for Cecil with the information about the benefits investigation for the genetic testing we discussed at his last visit. Cecil was un available so I left a voicemail with the following information:    Based on his insurance information, the estimated cost is $0.00. If he would like to proceed with the genetic testing, I can have the lab mail him a buccal swab for sample collection that comes with pre-paid postage to send back to the lab. I asked Cecil to call me back and let me know if he would like to proceed.     Geri Galindo MS, [facility]  Licensed Genetic Counselor  [facility]  Phone: [phone]  Fax: [phone]

## 2024-08-20 ENCOUNTER — TELEPHONE (OUTPATIENT)
Dept: OPHTHALMOLOGY | Facility: CLINIC | Age: 64
End: 2024-08-20

## 2024-08-20 ENCOUNTER — OFFICE VISIT (OUTPATIENT)
Dept: OPHTHALMOLOGY | Facility: CLINIC | Age: 64
End: 2024-08-20
Payer: COMMERCIAL

## 2024-08-20 DIAGNOSIS — H04.122 DRY EYE SYNDROME OF LEFT EYE: ICD-10-CM

## 2024-08-20 DIAGNOSIS — H25.813 COMBINED FORMS OF AGE-RELATED CATARACT OF BOTH EYES: Primary | ICD-10-CM

## 2024-08-20 DIAGNOSIS — H52.203 MYOPIA OF BOTH EYES WITH ASTIGMATISM AND PRESBYOPIA: ICD-10-CM

## 2024-08-20 DIAGNOSIS — H35.50 RETINAL DYSTROPHY: ICD-10-CM

## 2024-08-20 DIAGNOSIS — H52.13 MYOPIA OF BOTH EYES WITH ASTIGMATISM AND PRESBYOPIA: ICD-10-CM

## 2024-08-20 DIAGNOSIS — H52.4 MYOPIA OF BOTH EYES WITH ASTIGMATISM AND PRESBYOPIA: ICD-10-CM

## 2024-08-20 DIAGNOSIS — F32.A DEPRESSION, UNSPECIFIED DEPRESSION TYPE: ICD-10-CM

## 2024-08-20 PROCEDURE — 92134 CPTRZ OPH DX IMG PST SGM RTA: CPT | Performed by: OPHTHALMOLOGY

## 2024-08-20 PROCEDURE — 99214 OFFICE O/P EST MOD 30 MIN: CPT | Performed by: OPHTHALMOLOGY

## 2024-08-20 PROCEDURE — 76519 ECHO EXAM OF EYE: CPT | Mod: 50 | Performed by: OPHTHALMOLOGY

## 2024-08-20 ASSESSMENT — VISUAL ACUITY
METHOD: SNELLEN - LINEAR
OD_BAT_HIGH: 20/30+2
OD_BAT_LOW: 20/25-1
OS_BAT_LOW: 20/30-2
OS_PH_CC+: -2
OS_BAT_MED: 20/30-2
METHOD_MR: DIAGNOSTIC
OD_CC+: +3
OD_BAT_MED: 20/25
OS_BAT_HIGH: 20/40-2
CORRECTION_TYPE: GLASSES
OD_CC: 20/30
OS_PH_CC: 20/40
OS_CC: 20/50

## 2024-08-20 ASSESSMENT — SLIT LAMP EXAM - LIDS
COMMENTS: UPPER LID TRICHIASIS
COMMENTS: NORMAL

## 2024-08-20 ASSESSMENT — CONF VISUAL FIELD
METHOD: COUNTING FINGERS
OD_NORMAL: 1
OD_INFERIOR_TEMPORAL_RESTRICTION: 0
OD_SUPERIOR_NASAL_RESTRICTION: 0
OD_INFERIOR_NASAL_RESTRICTION: 0
OS_SUPERIOR_NASAL_RESTRICTION: 0
OS_SUPERIOR_TEMPORAL_RESTRICTION: 0
OS_INFERIOR_NASAL_RESTRICTION: 0
OS_INFERIOR_TEMPORAL_RESTRICTION: 0
OD_SUPERIOR_TEMPORAL_RESTRICTION: 0
OS_NORMAL: 1

## 2024-08-20 ASSESSMENT — REFRACTION_WEARINGRX
OD_CYLINDER: +2.00
OD_AXIS: 001
OD_ADD: +2.25
OD_SPHERE: -6.25
OS_ADD: +2.25
OS_SPHERE: -6.75
SPECS_TYPE: PAL
OS_CYLINDER: +2.00
OS_AXIS: 175

## 2024-08-20 ASSESSMENT — REFRACTION_MANIFEST
OS_SPHERE: -5.75
OD_SPHERE: -5.50
OD_ADD: +2.50
OD_CYLINDER: +2.25
OS_ADD: +2.50
OS_AXIS: 178
OS_CYLINDER: +2.25
OD_AXIS: 002

## 2024-08-20 ASSESSMENT — EXTERNAL EXAM - RIGHT EYE: OD_EXAM: NORMAL

## 2024-08-20 ASSESSMENT — EXTERNAL EXAM - LEFT EYE: OS_EXAM: NORMAL

## 2024-08-20 ASSESSMENT — TONOMETRY
OS_IOP_MMHG: 13
OD_IOP_MMHG: 11
IOP_METHOD: TONOPEN

## 2024-08-20 ASSESSMENT — CUP TO DISC RATIO
OS_RATIO: 0.4
OD_RATIO: 0.4

## 2024-08-20 ASSESSMENT — PATIENT HEALTH QUESTIONNAIRE - PHQ9: SUM OF ALL RESPONSES TO PHQ QUESTIONS 1-9: 12

## 2024-08-20 NOTE — PROGRESS NOTES
HPI       Cataract Evaluation    In both eyes.             Comments    Pt returns for a cataract evaluation. He states that his last eye exam was about 4/2023, and at that time it was noted that his cataracts were progressing. Pt states that the left eye is worse than the right, and he has an especially difficult time seeing well when in the bright sunshine. Pt prefers not to drive at night anymore. Wife states that pt often trips up and down the stairs with his increasingly blurry vision. No previous eye surgeries. Pt taking aspirin, but not with regularity. Is able to lay flat for surgery.    Depression Response    Patient completed the PHQ-9 assessment for depression and scored >9? Yes  Question 9 on the PHQ-9 was positive for suicidality? Yes  Does patient have current mental health provider? No    Is this a virtual visit? No    I personally notified the following: visit provider                      Last edited by Gill Menendez on 8/20/2024  9:40 AM.         Review of systems for the eyes was negative other than the pertinent positives/negatives listed in the HPI.      Assessment & Plan    HPI:  Cecil Haddad is a 64 year old male with history of myopia, retinal dystrophy presents for cataract evaluation. He notes intense glare.      POHx:  PMHx:  Current Medications:   Current Outpatient Medications   Medication Sig Dispense Refill    ASPIRIN 81 PO Take 1 tablet by mouth daily       No current facility-administered medications for this visit.     FHx: denies family history of ocular conditions   PSHx: denies history of ocular surgeries       Current Eye Medications:      Assessment & Plan:  (H25.813) Combined forms of age-related cataract of both eyes  (primary encounter diagnosis)  Special equipment/needs:  Eye: right  Anesthesia:topical  Dilates to: 8  Iris expansion:  No  Pseudoexfoliation: No  Trypan Blue: No  Trauma: No    Able lay to flat: Yes  Blood Thinner: Yes ASA 81  Tamsulosin: No  DM:  No  Guttae: No    Dominant Eye: right    Plan: Anthon both eyes   Cataract is believed to be significantly contributing to patient's visual impairment. Cataract surgery recommended and expected to improve vision. Vision is not correctable by glasses or other nonsurgical measures. R/B/A were discussed with the patient. These included, but are not limited to: bleeding, infection, loss of vision, loss of the eye, need for more surgery, glaucoma, retinal detachment, need for glasses, corneal edema. The patient voiced understanding and wishes to proceed. All lens options were discussed with the patient including monofocal lenses, multifocal lenses, and toric lenses. The risks and benefits of each were discussed, including halos, glare, and possible need for glasses. No guarantees about vision after surgery were given. The patient voiced understanding and wishes to proceed    Proceed with CE/IOL right eye followed by left eye .  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        Johny Mclain MD     Attending Physician Attestation:  Complete documentation of historical and exam elements from today's encounter can be found in the full encounter summary report (not reduplicated in this progress note).  I personally obtained the chief complaint(s) and history of present illness.  I confirmed and edited as necessary the review of systems, past medical/surgical history, family history, social history, and examination findings as documented by others; and I examined the patient myself.  I personally reviewed the relevant tests, images, and reports as documented  above.  I formulated and edited as necessary the assessment and plan and discussed the findings and management plan with the patient and family. - Johny Mclain MD

## 2024-08-20 NOTE — TELEPHONE ENCOUNTER
Called patient to schedule surgery with Dr. Mclain    Spoke with: Patient    Date(s) of Surgery: 10/18/24 and 11/1/24    Patient aware of approximate arrival time: No at N/A     Location of surgery: [facility]     Pre-Op H&P: [facility]    Informed patient that they need to call to schedule pre-op H&P within 30 days of surgery date: Yes      Post-Op Appt Dates: 10/28 at 3:15 PM,  11/19 at 9:45 PM      Discussed with patient pre-op RN will call 2-3 days prior to surgery with arrival time and instructions:  Yes       Standard Surgery Packet Sent: Yes 08/20/24  via Mail - Standard      Additional Information Sent in Packet: Post-op Appointment Itinerary      Informed patient that they will need an adult  to bring patient home from surgery: Yes  : UNK who, but aware they will need a .         Additional Comments:  N/A      All patients questions were answered and was instructed to review surgical packet and call back [phone] with any questions or concerns.       Roxanne Roque on 8/20/2024 at 3:32 PM

## 2024-08-20 NOTE — NURSING NOTE
Chief Complaints and History of Present Illnesses   Patient presents with    Cataract Evaluation     Chief Complaint(s) and History of Present Illness(es)       Cataract Evaluation              Laterality: both eyes              Comments    Pt returns for a cataract evaluation. He states that his last eye exam was about 4/2023, and at that time it was noted that his cataracts were progressing. Pt states that the left eye is worse than the right, and he has an especially difficult time seeing well when in the bright sunshine. Pt prefers not to drive at night anymore. Wife states that pt often trips up and down the stairs with his increasingly blurry vision. No previous eye surgeries. Pt taking aspirin, but not with regularity. Is able to lay flat for surgery.    Depression Response    Patient completed the PHQ-9 assessment for depression and scored >9? Yes  Question 9 on the PHQ-9 was positive for suicidality? Yes  Does patient have current mental health provider? No    Is this a virtual visit? No    I personally notified the following: visit provider

## 2024-08-21 ENCOUNTER — PATIENT OUTREACH (OUTPATIENT)
Dept: CARE COORDINATION | Facility: CLINIC | Age: 64
End: 2024-08-21
Payer: COMMERCIAL

## 2024-08-21 NOTE — PROGRESS NOTES
Social Work Telephone Message Note  [facility]     Patient Name:  Cecil Haddad  /Age:  1960 (64 year old)    Referral Source: [facility]  Reason for Referral:  Mental Health support and resources     attempted to contact Patient via telephone on 24. Sw was consulted to connect with patient regarding getting established with a mental health provider. Attempted to connect with patient, left a message providing writer contact information encouraging a return call.  will await Patient's return phone call and will provide assistance at that time.            ANG Viera, [facility]    [facility]  [phone]  [email or web address]

## 2024-08-23 ENCOUNTER — PATIENT OUTREACH (OUTPATIENT)
Dept: CARE COORDINATION | Facility: CLINIC | Age: 64
End: 2024-08-23
Payer: COMMERCIAL

## 2024-08-23 NOTE — PROGRESS NOTES
Social Work Telephone Message Note  [facility]     Patient Name:  Cecil Haddad  /Age:  1960 (64 year old)    Referral Source: [facility]  Reason for Referral:  mental health resources     contacted Patient via telephone on 24. Sw had been consulted to connect with patient regarding mental health resources and support. Provided Tim with [facility] intake contact information. Also, provided him with information for the [facility].  Discussed process on how to call into insurance provider to find someone that accepts his payor source.   Tim was appreciative of information and guidance. Provided him with writer contact information and encouraged him to call with any additional concerns or questions.   will continue to be available and provide support as needed.           ANG Viera, [facility]    [facility]  [phone]  [email or web address]       Detail Level: Detailed Quality 226: Preventive Care And Screening: Tobacco Use: Screening And Cessation Intervention: Patient screened for tobacco and is a smoker AND received Cessation Counseling

## 2024-08-29 ENCOUNTER — PATIENT OUTREACH (OUTPATIENT)
Dept: CARE COORDINATION | Facility: CLINIC | Age: 64
End: 2024-08-29
Payer: COMMERCIAL

## 2024-08-29 NOTE — PROGRESS NOTES
Social Work Telephone Message Note  [facility]     Patient Name:  Cecil Haddad  /Age:  1960 (64 year old)    Referral Source: opthalmology  Reason for Referral:  resources     attempted to contact Patient via telephone on 24. Sw had spoken with patient regarding mental health resources and navigation. Attempted to connect with patient again today for follow-up. Left a message providing writer contact information encouraging a return call.  will await Patient's return phone call and will provide assistance at that time.          ANG Viera, [facility]    [facility]  [phone]  [email or web address]

## 2024-10-10 ENCOUNTER — OFFICE VISIT (OUTPATIENT)
Dept: FAMILY MEDICINE | Facility: CLINIC | Age: 64
End: 2024-10-10
Payer: COMMERCIAL

## 2024-10-10 VITALS
DIASTOLIC BLOOD PRESSURE: 70 MMHG | HEIGHT: 70 IN | OXYGEN SATURATION: 98 % | SYSTOLIC BLOOD PRESSURE: 106 MMHG | BODY MASS INDEX: 20.77 KG/M2 | TEMPERATURE: 98 F | RESPIRATION RATE: 18 BRPM | WEIGHT: 145.1 LBS | HEART RATE: 66 BPM

## 2024-10-10 DIAGNOSIS — Z85.528 HISTORY OF RENAL CELL CARCINOMA: ICD-10-CM

## 2024-10-10 DIAGNOSIS — F17.209 TOBACCO USE DISORDER, CONTINUOUS: ICD-10-CM

## 2024-10-10 DIAGNOSIS — J44.9 CHRONIC OBSTRUCTIVE PULMONARY DISEASE, UNSPECIFIED COPD TYPE (H): ICD-10-CM

## 2024-10-10 DIAGNOSIS — Z01.818 PRE-OP EXAM: Primary | ICD-10-CM

## 2024-10-10 DIAGNOSIS — Z87.19 H/O CHRONIC PANCREATITIS: ICD-10-CM

## 2024-10-10 DIAGNOSIS — H25.813 COMBINED FORMS OF AGE-RELATED CATARACT OF BOTH EYES: ICD-10-CM

## 2024-10-10 PROCEDURE — 99204 OFFICE O/P NEW MOD 45 MIN: CPT | Performed by: INTERNAL MEDICINE

## 2024-10-10 ASSESSMENT — PAIN SCALES - GENERAL: PAINLEVEL: NO PAIN (0)

## 2024-10-10 NOTE — PROGRESS NOTES
Preoperative Evaluation  [address] [address]  Phone: [phone]  Primary Provider: Physician No Ref-Primary  Pre-op Performing Provider: Sanjiv Wade MD  Oct 10, 2024             10/10/2024   Surgical Information   What procedure is being done?   Cataract    Facility or Hospital where procedure/surgery will be performed:  [facility]   Who is doing the procedure / surgery?  Dr. Johny Mclain   Date of surgery / procedure: oct 18  2024 & nov 1 2024   Time of surgery / procedure: 3:00 both days   Where do you plan to recover after surgery? at home with family        Fax number for surgical facility: Note does not need to be faxed, will be available electronically in Epic.    Assessment & Plan     1.  Preop physical exam completed.  Patient optimized to undergo the planned surgical procedure.  2.  Age-related cataract of both eyes.  Patient to undergo surgery with 1 eye on October 18 followed by the second eye on November 1.  3.  Tobacco use disorder.  48-pack-year history of smoking.  4.  History of chronic pancreatitis.  Had recurrent idiopathic recurrent pancreatitis between 3535-2534.  No episodes since.  5.  History of renal cell carcinoma treated with left nephrectomy in 2004.  6.  Chronic obstructive pulmonary disease based on clinical exam.  Patient has increased hyperresonant chest with decreased breath sounds.  However clinically he denies been dyspneic.      The proposed surgical procedure is considered  risk.    Recommendation  Approval given to proceed with proposed procedure, without further diagnostic evaluation.    Rhys Jacob is a 64 year old, presenting for the following:  Pre-Op Exam          10/10/2024     3:38 PM   Additional Questions   Roomed by Radha   Accompanied by self     HPI related to upcoming procedure:     64-year-old gentleman is to undergo bilateral cataract surgery.  History significant for  left nephrectomy in 2004 for renal cell carcinoma.  Also had recurrent pancreatitis between 2006 and 2010.  Cause was not identified.  He developed chronic pancreatitis but since 2010 he has been asymptomatic.  He smokes up to a pack of cigarettes a day and has since age 17.  He denies having wheezing or shortness of breath.  He does have a smoker's cough.  He does not exert much.  He does cut his lawn.  Denies chest pain or chest tightness.  No dizziness or lightheadedness.  He does not drink alcohol except on rare occasional basis.        10/10/2024   Pre-Op Questionnaire   Have you ever had a heart attack or stroke? No   Have you ever had surgery on your heart or blood vessels, such as a stent placement, a coronary artery bypass, or surgery on an artery in your head, neck, heart, or legs? No   Do you have chest pain with activity? No   Do you have a history of heart failure? No   Do you currently have a cold, bronchitis or symptoms of other infection? (!) YES    Do you have a cough, shortness of breath, or wheezing? (!) YES    Do you or anyone in your family have previous history of blood clots? No   Do you or does anyone in your family have a serious bleeding problem such as prolonged bleeding following surgeries or cuts? No   Have you ever had problems with anemia or been told to take iron pills? No   Have you had any abnormal blood loss such as black, tarry or bloody stools? (!) YES    Have you ever had a blood transfusion? No   Are you willing to have a blood transfusion if it is medically needed before, during, or after your surgery? Yes   Have you or any of your relatives ever had problems with anesthesia? No   Do you have sleep apnea, excessive snoring or daytime drowsiness? No   Do you have any artifical heart valves or other implanted medical devices like a pacemaker, defibrillator, or continuous glucose monitor? No   Do you have artificial joints? No   Are you allergic to latex? No        Health Care  "Directive  Patient does not have a Health Care Directive or Living Will:       Patient Active Problem List    Diagnosis Date Noted    Combined forms of age-related cataract of both eyes 08/20/2024     Priority: Medium      Past Medical History:   Diagnosis Date    Nonsenile cataract     Pancreas (digestive gland) works poorly     Psoriasis of kidney. Intermittent attacks     Past Surgical History:   Procedure Laterality Date    KIDNEY SURGERY Right 2001    Removed      Current Outpatient Medications   Medication Sig Dispense Refill    ASPIRIN 81 PO Take 1 tablet by mouth daily         No Known Allergies     Social History     Tobacco Use    Smoking status: Smoker, Current Status Unknown    Smokeless tobacco: Never   Substance Use Topics    Alcohol use: Never     Family History   Problem Relation Age of Onset    Glaucoma No family hx of     Macular Degeneration No family hx of      History   Drug Use    Types: Marijuana             Review of Systems  Constitutional, HEENT, cardiovascular, pulmonary, GI, , musculoskeletal, neuro, skin, endocrine and psych systems are negative, except as otherwise noted.    Objective    Ht 1.765 m (5' 9.5\")   Wt 65.8 kg (145 lb 1.6 oz)   BMI 21.12 kg/m     Estimated body mass index is 21.12 kg/m  as calculated from the following:    Height as of this encounter: 1.765 m (5' 9.5\").    Weight as of this encounter: 65.8 kg (145 lb 1.6 oz).  Physical Exam  GENERAL: alert and no distress  EYES: Eyes grossly normal to inspection, PERRL and conjunctivae and sclerae normal  HENT: normal cephalic/atraumatic, ear canals and TM's normal, nose and mouth without ulcers or lesions, oropharynx clear, oral mucous membranes moist, and edentulous  NECK: no adenopathy, no asymmetry, masses, or scars  RESP: lungs clear to auscultation - no rales, rhonchi or wheezes and decreased breath sounds throughout  CV: regular rate and rhythm, normal S1 S2, no S3 or S4, no murmur, click or rub, no peripheral " "edema  ABDOMEN: soft, nontender, no hepatosplenomegaly, no masses and bowel sounds normal  MS: no gross musculoskeletal defects noted, no edema  SKIN: no suspicious lesions or rashes  NEURO: Normal strength and tone, mentation intact and speech normal  PSYCH: mentation appears normal, affect normal/bright    No results for input(s): \"HGB\", \"PLT\", \"INR\", \"NA\", \"POTASSIUM\", \"CR\", \"A1C\" in the last 8760 hours.     Diagnostics     No EKG required for low risk surgery (cataract, skin procedure, breast biopsy, etc).    Revised Cardiac Risk Index (RCRI)  The patient has the following serious cardiovascular risks for perioperative complications:   - No serious cardiac risks = 0 points     RCRI Interpretation: 0 points: Class I (very low risk - 0.4% complication rate)         Signed Electronically by: Sanjiv Wade MD  A copy of this evaluation report is provided to the requesting physician.        "

## 2024-10-11 DIAGNOSIS — H25.813 COMBINED FORMS OF AGE-RELATED CATARACT OF BOTH EYES: Primary | ICD-10-CM

## 2024-10-11 RX ORDER — KETOROLAC TROMETHAMINE 5 MG/ML
SOLUTION OPHTHALMIC
Qty: 5 ML | Refills: 0 | Status: SHIPPED | OUTPATIENT
Start: 2024-10-16 | End: 2024-10-25

## 2024-10-11 RX ORDER — PREDNISOLONE ACETATE 10 MG/ML
SUSPENSION/ DROPS OPHTHALMIC
Qty: 5 ML | Refills: 0 | Status: SHIPPED | OUTPATIENT
Start: 2024-10-18 | End: 2024-10-25

## 2024-10-11 RX ORDER — MOXIFLOXACIN 5 MG/ML
1 SOLUTION/ DROPS OPHTHALMIC 4 TIMES DAILY
Qty: 3 ML | Refills: 0 | Status: SHIPPED | OUTPATIENT
Start: 2024-10-18 | End: 2024-10-25

## 2024-10-14 ENCOUNTER — ANESTHESIA EVENT (OUTPATIENT)
Dept: SURGERY | Facility: AMBULATORY SURGERY CENTER | Age: 64
End: 2024-10-14
Payer: COMMERCIAL

## 2024-10-18 ENCOUNTER — HOSPITAL ENCOUNTER (OUTPATIENT)
Facility: AMBULATORY SURGERY CENTER | Age: 64
Discharge: HOME | End: 2024-10-18
Attending: OPHTHALMOLOGY
Payer: COMMERCIAL

## 2024-10-18 ENCOUNTER — ANESTHESIA (OUTPATIENT)
Dept: SURGERY | Facility: AMBULATORY SURGERY CENTER | Age: 64
End: 2024-10-18
Payer: COMMERCIAL

## 2024-10-18 ENCOUNTER — OFFICE VISIT (OUTPATIENT)
Dept: OPHTHALMOLOGY | Facility: CLINIC | Age: 64
End: 2024-10-18
Payer: COMMERCIAL

## 2024-10-18 VITALS
TEMPERATURE: 97.8 F | OXYGEN SATURATION: 99 % | HEART RATE: 53 BPM | DIASTOLIC BLOOD PRESSURE: 68 MMHG | RESPIRATION RATE: 18 BRPM | SYSTOLIC BLOOD PRESSURE: 100 MMHG

## 2024-10-18 DIAGNOSIS — Z96.1 PSEUDOPHAKIA, RIGHT EYE: Primary | ICD-10-CM

## 2024-10-18 DIAGNOSIS — H25.811 COMBINED FORM OF AGE-RELATED CATARACT, RIGHT EYE: Primary | ICD-10-CM

## 2024-10-18 PROCEDURE — G8918 PT W/O PREOP ORDER IV AB PRO: HCPCS

## 2024-10-18 PROCEDURE — 66984 XCAPSL CTRC RMVL W/O ECP: CPT | Mod: LT | Performed by: OPHTHALMOLOGY

## 2024-10-18 PROCEDURE — 66984 XCAPSL CTRC RMVL W/O ECP: CPT | Mod: RT

## 2024-10-18 PROCEDURE — 66984 XCAPSL CTRC RMVL W/O ECP: CPT | Performed by: NURSE ANESTHETIST, CERTIFIED REGISTERED

## 2024-10-18 PROCEDURE — 66984 XCAPSL CTRC RMVL W/O ECP: CPT | Performed by: STUDENT IN AN ORGANIZED HEALTH CARE EDUCATION/TRAINING PROGRAM

## 2024-10-18 PROCEDURE — G8907 PT DOC NO EVENTS ON DISCHARG: HCPCS

## 2024-10-18 PROCEDURE — 99024 POSTOP FOLLOW-UP VISIT: CPT | Performed by: OPHTHALMOLOGY

## 2024-10-18 DEVICE — TECNIS SIMPLICITY TECNIS EYHANCE U 14.5D
Type: IMPLANTABLE DEVICE | Site: EYE | Status: FUNCTIONAL
Brand: TECNIS SIMPLICITY

## 2024-10-18 RX ORDER — ONDANSETRON 4 MG/1
4 TABLET, ORALLY DISINTEGRATING ORAL EVERY 30 MIN PRN
Status: DISCONTINUED | OUTPATIENT
Start: 2024-10-18 | End: 2024-10-19 | Stop reason: HOSPADM

## 2024-10-18 RX ORDER — DEXAMETHASONE SODIUM PHOSPHATE 4 MG/ML
4 INJECTION, SOLUTION INTRA-ARTICULAR; INTRALESIONAL; INTRAMUSCULAR; INTRAVENOUS; SOFT TISSUE
Status: DISCONTINUED | OUTPATIENT
Start: 2024-10-18 | End: 2024-10-19 | Stop reason: HOSPADM

## 2024-10-18 RX ORDER — ACETAMINOPHEN 325 MG/1
975 TABLET ORAL ONCE
Status: COMPLETED | OUTPATIENT
Start: 2024-10-18 | End: 2024-10-18

## 2024-10-18 RX ORDER — FENTANYL CITRATE 50 UG/ML
25 INJECTION, SOLUTION INTRAMUSCULAR; INTRAVENOUS EVERY 5 MIN PRN
Status: DISCONTINUED | OUTPATIENT
Start: 2024-10-18 | End: 2024-10-19 | Stop reason: HOSPADM

## 2024-10-18 RX ORDER — DICLOFENAC SODIUM 1 MG/ML
1 SOLUTION/ DROPS OPHTHALMIC
Status: COMPLETED | OUTPATIENT
Start: 2024-10-18 | End: 2024-10-18

## 2024-10-18 RX ORDER — PROPARACAINE HYDROCHLORIDE 5 MG/ML
1 SOLUTION/ DROPS OPHTHALMIC ONCE
Status: COMPLETED | OUTPATIENT
Start: 2024-10-18 | End: 2024-10-18

## 2024-10-18 RX ORDER — NALOXONE HYDROCHLORIDE 0.4 MG/ML
0.1 INJECTION, SOLUTION INTRAMUSCULAR; INTRAVENOUS; SUBCUTANEOUS
Status: DISCONTINUED | OUTPATIENT
Start: 2024-10-18 | End: 2024-10-19 | Stop reason: HOSPADM

## 2024-10-18 RX ORDER — CYCLOPENTOLAT/TROPIC/PHENYLEPH 1%-1%-2.5%
1 DROPS (EA) OPHTHALMIC (EYE)
Status: COMPLETED | OUTPATIENT
Start: 2024-10-18 | End: 2024-10-18

## 2024-10-18 RX ORDER — MOXIFLOXACIN IN NACL,ISO-OS/PF 0.3MG/0.3
SYRINGE (ML) INTRAOCULAR PRN
Status: DISCONTINUED | OUTPATIENT
Start: 2024-10-18 | End: 2024-10-18 | Stop reason: HOSPADM

## 2024-10-18 RX ORDER — POVIDONE-IODINE 5 %
SOLUTION, NON-ORAL OPHTHALMIC (EYE) PRN
Status: DISCONTINUED | OUTPATIENT
Start: 2024-10-18 | End: 2024-10-18 | Stop reason: HOSPADM

## 2024-10-18 RX ORDER — SODIUM CHLORIDE, SODIUM LACTATE, POTASSIUM CHLORIDE, CALCIUM CHLORIDE 600; 310; 30; 20 MG/100ML; MG/100ML; MG/100ML; MG/100ML
INJECTION, SOLUTION INTRAVENOUS CONTINUOUS
Status: DISCONTINUED | OUTPATIENT
Start: 2024-10-18 | End: 2024-10-19 | Stop reason: HOSPADM

## 2024-10-18 RX ORDER — FENTANYL CITRATE 50 UG/ML
50 INJECTION, SOLUTION INTRAMUSCULAR; INTRAVENOUS EVERY 5 MIN PRN
Status: DISCONTINUED | OUTPATIENT
Start: 2024-10-18 | End: 2024-10-19 | Stop reason: HOSPADM

## 2024-10-18 RX ORDER — BALANCED SALT SOLUTION 6.4; .75; .48; .3; 3.9; 1.7 MG/ML; MG/ML; MG/ML; MG/ML; MG/ML; MG/ML
SOLUTION OPHTHALMIC PRN
Status: DISCONTINUED | OUTPATIENT
Start: 2024-10-18 | End: 2024-10-18 | Stop reason: HOSPADM

## 2024-10-18 RX ORDER — LIDOCAINE 40 MG/G
CREAM TOPICAL
Status: DISCONTINUED | OUTPATIENT
Start: 2024-10-18 | End: 2024-10-19 | Stop reason: HOSPADM

## 2024-10-18 RX ORDER — PROPARACAINE HYDROCHLORIDE 5 MG/ML
1 SOLUTION/ DROPS OPHTHALMIC
Status: DISCONTINUED | OUTPATIENT
Start: 2024-10-18 | End: 2024-10-19 | Stop reason: HOSPADM

## 2024-10-18 RX ORDER — ONDANSETRON 2 MG/ML
4 INJECTION INTRAMUSCULAR; INTRAVENOUS EVERY 30 MIN PRN
Status: DISCONTINUED | OUTPATIENT
Start: 2024-10-18 | End: 2024-10-19 | Stop reason: HOSPADM

## 2024-10-18 RX ORDER — OXYCODONE HYDROCHLORIDE 5 MG/1
10 TABLET ORAL
Status: DISCONTINUED | OUTPATIENT
Start: 2024-10-18 | End: 2024-10-19 | Stop reason: HOSPADM

## 2024-10-18 RX ORDER — MOXIFLOXACIN 5 MG/ML
1 SOLUTION/ DROPS OPHTHALMIC
Status: COMPLETED | OUTPATIENT
Start: 2024-10-18 | End: 2024-10-18

## 2024-10-18 RX ORDER — TIMOLOL MALEATE 5 MG/ML
SOLUTION/ DROPS OPHTHALMIC PRN
Status: DISCONTINUED | OUTPATIENT
Start: 2024-10-18 | End: 2024-10-18 | Stop reason: HOSPADM

## 2024-10-18 RX ORDER — OXYCODONE HYDROCHLORIDE 5 MG/1
5 TABLET ORAL
Status: DISCONTINUED | OUTPATIENT
Start: 2024-10-18 | End: 2024-10-19 | Stop reason: HOSPADM

## 2024-10-18 RX ORDER — TETRACAINE HYDROCHLORIDE 5 MG/ML
SOLUTION OPHTHALMIC PRN
Status: DISCONTINUED | OUTPATIENT
Start: 2024-10-18 | End: 2024-10-18 | Stop reason: HOSPADM

## 2024-10-18 RX ADMIN — PROPARACAINE HYDROCHLORIDE 1 DROP: 5 SOLUTION/ DROPS OPHTHALMIC at 10:21

## 2024-10-18 RX ADMIN — MOXIFLOXACIN 1 DROP: 5 SOLUTION/ DROPS OPHTHALMIC at 10:26

## 2024-10-18 RX ADMIN — PROPARACAINE HYDROCHLORIDE 1 DROP: 5 SOLUTION/ DROPS OPHTHALMIC at 10:23

## 2024-10-18 RX ADMIN — DICLOFENAC SODIUM 1 DROP: 1 SOLUTION/ DROPS OPHTHALMIC at 10:22

## 2024-10-18 RX ADMIN — MOXIFLOXACIN 1 DROP: 5 SOLUTION/ DROPS OPHTHALMIC at 10:24

## 2024-10-18 RX ADMIN — Medication 1 DROP: at 10:25

## 2024-10-18 RX ADMIN — Medication 1 DROP: at 10:23

## 2024-10-18 RX ADMIN — Medication 1 DROP: at 10:27

## 2024-10-18 RX ADMIN — SODIUM CHLORIDE, SODIUM LACTATE, POTASSIUM CHLORIDE, CALCIUM CHLORIDE: 600; 310; 30; 20 INJECTION, SOLUTION INTRAVENOUS at 11:11

## 2024-10-18 RX ADMIN — MOXIFLOXACIN 1 DROP: 5 SOLUTION/ DROPS OPHTHALMIC at 10:21

## 2024-10-18 RX ADMIN — ACETAMINOPHEN 975 MG: 325 TABLET ORAL at 10:19

## 2024-10-18 RX ADMIN — PROPARACAINE HYDROCHLORIDE 1 DROP: 5 SOLUTION/ DROPS OPHTHALMIC at 10:26

## 2024-10-18 RX ADMIN — DICLOFENAC SODIUM 1 DROP: 1 SOLUTION/ DROPS OPHTHALMIC at 10:26

## 2024-10-18 RX ADMIN — DICLOFENAC SODIUM 1 DROP: 1 SOLUTION/ DROPS OPHTHALMIC at 10:24

## 2024-10-18 ASSESSMENT — VISUAL ACUITY
OS_CC+: -1
OD_SC: 20/80
OS_CC: 20/25
METHOD: SNELLEN - LINEAR
CORRECTION_TYPE: GLASSES

## 2024-10-18 ASSESSMENT — TONOMETRY
OS_IOP_MMHG: 7
IOP_METHOD: ICARE
OD_IOP_MMHG: 11

## 2024-10-18 ASSESSMENT — REFRACTION_WEARINGRX
SPECS_TYPE: PAL
OS_CYLINDER: +2.00
OD_SPHERE: -6.25
OS_SPHERE: -6.75
OS_ADD: +2.25
OD_AXIS: 001
OS_AXIS: 175
OD_ADD: +2.25
OD_CYLINDER: +2.00

## 2024-10-18 ASSESSMENT — SLIT LAMP EXAM - LIDS: COMMENTS: NORMAL

## 2024-10-18 ASSESSMENT — LIFESTYLE VARIABLES: TOBACCO_USE: 1

## 2024-10-18 NOTE — PROGRESS NOTES
HPI       Post Op (Ophthalmology) Right Eye    In right eye.  Since onset it is stable.  Associated symptoms include Negative for eye pain, flashes and floaters.  Treatments tried include eye drops.             Comments    Here for same day post op right eye. VA doing well. No pain. No flashes or floaters.    aJ Dunacn COT 1:42 PM October 18, 2024             Last edited by Ja Duncan on 10/18/2024  1:42 PM.         Review of systems for the eyes was negative other than the pertinent positives/negatives listed in the HPI.      Assessment & Plan    HPI:  Cecil Haddad is a 64 year old male with history of myopia, retinal dystrophy presents for Postoperative day 0 right eye       POHx: cone-nicholas dystrophy, Cataract extraction/intraocular lens   PMHx:   Current Medications:   Current Outpatient Medications   Medication Sig Dispense Refill    ASPIRIN 81 PO Take 1 tablet by mouth daily      ketorolac (ACULAR) 0.5 % ophthalmic solution Place 1 drop into the right eye 4 times daily for 7 days, THEN 1 drop 3 times daily for 7 days, THEN 1 drop 2 times daily for 7 days, THEN 1 drop daily for 7 days. Start 2 days prior to surgery.. 5 mL 0    moxifloxacin (VIGAMOX) 0.5 % ophthalmic solution Place 1 drop into the right eye 4 times daily for 7 days. Start after surgery. 3 mL 0    prednisoLONE acetate (PRED FORTE) 1 % ophthalmic suspension Place 1 drop into the right eye 4 times daily for 7 days, THEN 1 drop 3 times daily for 7 days, THEN 1 drop 2 times daily for 7 days, THEN 1 drop daily for 7 days. Start after surgery.. 5 mL 0     No current facility-administered medications for this visit.     Facility-Administered Medications Ordered in Other Visits   Medication Dose Route Frequency Provider Last Rate Last Admin    dexAMETHasone (DECADRON) injection 4 mg  4 mg Intravenous Once PRN Evans Doe MD        dexAMETHasone (DECADRON) injection 4 mg  4 mg Intravenous Once PRN Evans Doe MD        fentaNYL (PF) (SUBLIMAZE)  injection 25 mcg  25 mcg Intravenous Q5 Min PRN Evans Doe MD        fentaNYL (PF) (SUBLIMAZE) injection 50 mcg  50 mcg Intravenous Q5 Min PRN Evans Doe MD        HYDROmorphone (DILAUDID) injection 0.2 mg  0.2 mg Intravenous Q5 Min PRN Evans Doe MD        HYDROmorphone (DILAUDID) injection 0.4 mg  0.4 mg Intravenous Q5 Min PRN Evans Doe MD        lactated ringers infusion   Intravenous Continuous Evans Doe  mL/hr at 10/18/24 1111 New Bag at 10/18/24 1111    lactated ringers infusion   Intravenous Continuous Evans Doe MD        lidocaine (LMX4) kit   Topical Q1H PRN Evans Doe MD        lidocaine 1 % 0.1-1 mL  0.1-1 mL Other Q1H PRN Evans Doe MD        naloxone (NARCAN) injection 0.1 mg  0.1 mg Intravenous Q2 Min PRN Evans Doe MD        naloxone (NARCAN) injection 0.1 mg  0.1 mg Intravenous Q2 Min PRN Evans Doe MD        ondansetron (ZOFRAN ODT) ODT tab 4 mg  4 mg Oral Q30 Min PRN Evans Doe MD        Or    ondansetron (ZOFRAN) injection 4 mg  4 mg Intravenous Q30 Min PRN Evans Doe MD        ondansetron (ZOFRAN ODT) ODT tab 4 mg  4 mg Oral Q30 Min PRN Evans Doe MD        Or    ondansetron (ZOFRAN) injection 4 mg  4 mg Intravenous Q30 Min PRN Evans Doe MD        oxyCODONE (ROXICODONE) tablet 10 mg  10 mg Oral Once PRN Evans Doe MD        oxyCODONE (ROXICODONE) tablet 5 mg  5 mg Oral Once PRN Evans Doe MD        prochlorperazine (COMPAZINE) injection 5 mg  5 mg Intravenous Q6H PRN Evans Doe MD        prochlorperazine (COMPAZINE) injection 5 mg  5 mg Intravenous Q6H PRN Evans Doe MD        proparacaine (ALCAINE) 0.5 % ophthalmic solution 1 drop  1 drop Ophthalmic q5 Min Prior to Surgery Johny Mclain MD   1 drop at 10/18/24 1023    sodium chloride (PF) 0.9% PF flush 3 mL  3 mL Intracatheter Q8H Evans Doe MD        sodium chloride (PF) 0.9% PF flush 3 mL   3 mL Intracatheter q1 min Evans Matson MD         FHx: denies family history of ocular conditions   PSHx: Cataract extraction/intraocular lens Mclain right eye 10/18/24     Current Eye Medications:      Assessment & Plan:  Pseudophakia, right eye, day 0  Mclain right eye 10/18/24   Doing well  Keep patch in place at night for 7 days  Start post-operative drops and taper according to instructions  Post-operative do's and don'ts reviewed, questions answered    Recheck 1 week    (H25.813) Combined forms of age-related cataract of both eyes    Special equipment/needs:  Eye: left  Anesthesia:topical  Dilates to: 8  Iris expansion:  No  Pseudoexfoliation: No  Trypan Blue: No  Trauma: No    Able lay to flat: Yes  Blood Thinner: Yes ASA 81  Tamsulosin: No  DM: No  Guttae: No    Dominant Eye: right    Plan: [place] both eyes     Proceed with CE/IOL left eye .      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        Johny Mclain MD     Attending Physician Attestation:  Complete documentation of historical and exam elements from today's encounter can be found in the full encounter summary report (not reduplicated in this progress note).  I personally obtained the chief complaint(s) and history of present illness.  I confirmed and edited as necessary the review of systems, past medical/surgical history, family history, social history, and examination findings as documented by others; and I examined the patient myself.  I personally reviewed the relevant tests, images, and reports as documented above.  I formulated and edited as necessary the assessment and plan and discussed the findings and management plan with the  patient and family. - Jhony Mclain MD

## 2024-10-18 NOTE — ANESTHESIA POSTPROCEDURE EVALUATION
Patient: Cecil Haddad    Procedure: Procedure(s):  PHACOEMULSIFICATION, CATARACT, WITH INTRAOCULAR LENS IMPLANT       Anesthesia Type:  MAC    Note:  Disposition: Outpatient   Postop Pain Control: Uneventful            Sign Out: Well controlled pain   PONV: No   Neuro/Psych: Uneventful            Sign Out: Acceptable/Baseline neuro status   Airway/Respiratory: Uneventful            Sign Out: Acceptable/Baseline resp. status   CV/Hemodynamics: Uneventful            Sign Out: Acceptable CV status; No obvious hypovolemia; No obvious fluid overload   Other NRE: NONE   DID A NON-ROUTINE EVENT OCCUR? No           Last vitals:  Vitals Value Taken Time   /68 10/18/24 1201   Temp 97.8  F (36.6  C) 10/18/24 1143   Pulse 53 10/18/24 1201   Resp 18 10/18/24 1201   SpO2 99 % 10/18/24 1201       Electronically Signed By: Jose A Hammer MD  October 18, 2024  12:14 PM

## 2024-10-18 NOTE — ANESTHESIA CARE TRANSFER NOTE
Patient: Cecil Haddad    Procedure: Procedure(s):  PHACOEMULSIFICATION, CATARACT, WITH INTRAOCULAR LENS IMPLANT       Diagnosis: Combined forms of age-related cataract of both eyes [H25.813]  Diagnosis Additional Information: No value filed.    Anesthesia Type:   MAC     Note:    Oropharynx: oropharynx clear of all foreign objects  Level of Consciousness: awake  Oxygen Supplementation: room air    Independent Airway: airway patency satisfactory and stable  Dentition: dentition unchanged  Vital Signs Stable: post-procedure vital signs reviewed and stable  Report to RN Given: handoff report given  Patient transferred to: Phase II  Comments: To Phase II. Report to RN.  VSS Resp status stable.  Handoff Report: Identifed the Patient, Identified the Reponsible Provider, Reviewed the pertinent medical history, Discussed the surgical course, Reviewed Intra-OP anesthesia mangement and issues during anesthesia, Set expectations for post-procedure period and Allowed opportunity for questions and acknowledgement of understanding    Vitals:  Vitals Value Taken Time   BP     Temp     Pulse     Resp     SpO2         Electronically Signed By: BETH Bruce CRNA  October 18, 2024  11:41 AM

## 2024-10-18 NOTE — ANESTHESIA PREPROCEDURE EVALUATION
"Anesthesia Pre-Procedure Evaluation    Patient: Cecil Haddad   MRN: [id number] : 1960        Procedure : Procedure(s):  PHACOEMULSIFICATION, CATARACT, WITH INTRAOCULAR LENS IMPLANT          Past Medical History:   Diagnosis Date    H/O chronic pancreatitis 2006    History of renal cell carcinoma 2004    Nonsenile cataract     Recurrent acute pancreatitis 2007    5 episodes between  and 2010    Renal cancer, left (H) 2004    Tobacco use disorder, continuous 10/10/2024      Past Surgical History:   Procedure Laterality Date    NEPHRECTOMY RT/LT Left 2004      No Known Allergies   Social History     Tobacco Use    Smoking status: Every Day     Current packs/day: 1.00     Average packs/day: 1 pack/day for 47.8 years (47.8 ttl pk-yrs)     Types: Cigarettes     Start date:     Smokeless tobacco: Current    Tobacco comments:     rare   Substance Use Topics    Alcohol use: Never      Wt Readings from Last 1 Encounters:   10/10/24 65.8 kg (145 lb 1.6 oz)        Anesthesia Evaluation   Pt has had prior anesthetic.     No history of anesthetic complications       ROS/MED HX  ENT/Pulmonary:     (+)                tobacco use, Current use, 1 packs/day,  patient smoked within 24 hours,                    Neurologic:       Cardiovascular:       METS/Exercise Tolerance:     Hematologic:       Musculoskeletal:       GI/Hepatic: Comment: H/O chronic pancreatitis      Renal/Genitourinary:     (+) renal disease (hx of RCC),             Endo:       Psychiatric/Substance Use:       Infectious Disease:       Malignancy:       Other:            Physical Exam    Airway        Mallampati: I   TM distance: > 3 FB   Neck ROM: full   Mouth opening: > 3 cm    Respiratory Devices and Support         Dental       (+) Edentulous      Cardiovascular   cardiovascular exam normal          Pulmonary   pulmonary exam normal                OUTSIDE LABS:  CBC: No results found for: \"WBC\", \"HGB\", \"HCT\", \"PLT\"  BMP: No " "results found for: \"NA\", \"POTASSIUM\", \"CHLORIDE\", \"CO2\", \"BUN\", \"CR\", \"GLC\"  COAGS: No results found for: \"PTT\", \"INR\", \"FIBR\"  POC: No results found for: \"BGM\", \"HCG\", \"HCGS\"  HEPATIC: No results found for: \"ALBUMIN\", \"PROTTOTAL\", \"ALT\", \"AST\", \"GGT\", \"ALKPHOS\", \"BILITOTAL\", \"BILIDIRECT\", \"SAMMY\"  OTHER: No results found for: \"PH\", \"LACT\", \"A1C\", \"EMILY\", \"PHOS\", \"MAG\", \"LIPASE\", \"AMYLASE\", \"TSH\", \"T4\", \"T3\", \"CRP\", \"SED\"    Anesthesia Plan    ASA Status:  2    NPO Status:  NPO Appropriate    Anesthesia Type: MAC.     - Reason for MAC: straight local not clinically adequate   Induction: Propofol.   Maintenance: TIVA.        Consents    Anesthesia Plan(s) and associated risks, benefits, and realistic alternatives discussed. Questions answered and patient/representative(s) expressed understanding.     - Discussed: Risks, Benefits and Alternatives for BOTH SEDATION and the PROCEDURE were discussed     - Discussed with:  Patient            Postoperative Care    Pain management: IV analgesics, Oral pain medications.   PONV prophylaxis: Ondansetron (or other 5HT-3), Dexamethasone or Solumedrol     Comments:               Jose A Hammer MD    I have reviewed the pertinent notes and labs in the chart from the past 30 days and (re)examined the patient.  Any updates or changes from those notes are reflected in this note.                                 "

## 2024-10-18 NOTE — OP NOTE
PREOPERATIVE DIAGNOSIS:   1. Combined form of age-related cataract, right eye     Right eye   POSTOPERATIVE DIAGNOSIS: Same   PROCEDURES:   1. Cataract extraction with intraocular lens implant Right eye.  SURGEON: Johny Mclain M.D.  INDICATIONS: The patient Cecil Haddad presented to the eye clinic with decreased vision secondary to cataract in the Right eye. The risks, benefits and alternatives to cataract extraction were discussed. The patient elected to proceed. All questions were answered to the patient's satisfaction.   DESCRIPTION OF PROCEDURE:   Prior to the procedure, appropriate cardiac and respiratory monitors were applied to the patient.  The patient was brought to the operating room.  A drop of topical tetracaine was placed in the operative eye.  With adequate anesthesia, the Right eye was prepped and draped in the usual sterile fashion. A lid speculum was placed, and the operating microscope was rotated into position. A surgical pause was carried out to identify with all members of the surgical team the correct surgical site. A paracentesis was created.  Through this limbal paracentesis, the anterior chamber was filled with preservative-free lidocaine with epi shugarcaine followed by viscoelastic.  A temporal wound was created at the limbus using a 2.6 mm blade. A capsulorrhexis was initiated using a bent 25-gauge needle and was completed in continuous and circular fashion using the capsulorrhexis forceps. The lens nucleus was hydrodissected using balanced salt solution.  The lens nucleus was rotated and removed using phacoemulsification in a stop and chop technique.  Residual cortical material was removed using irrigation-aspiration.  The capsular bag was reinflated to its maximal extent with cohesive viscoelastic.  A 14.5 diopter DIB00 was inserted into the capsular bag.  The lens power selected was reviewed using the intraocular lens power measurements that were obtained preoperatively to  confirm that the correct lens was selected for the desired post-operative refractive state. The residual viscoelastic was removed in its entirety, the wounds were hydrated and found to be self-sealing.  Intracameral moxifloxacin was administered. Tactile pressure was confirmed to be in a normal range.  The lid speculum was removed, a drop of timolol was administered, and a shield was applied.  The patient tolerated the procedure well, and there were no complications.    PLAN: The patient will be discharged to home and will follow up today  EBL:  None  Complications:  None  Implant Name Type Inv. Item Serial No.  Lot No. LRB No. Used Action   EYE IMP IOL TECNIS IOL DIB00 14.5 DIB00 14.5 - [id number] Lens/Eye Implant EYE IMP IOL TECNIS IOL DIB00 14.5 DIB00 14.5 [id number] [employer]  Right 1 Implanted

## 2024-10-25 DIAGNOSIS — H25.813 COMBINED FORMS OF AGE-RELATED CATARACT OF BOTH EYES: ICD-10-CM

## 2024-10-25 RX ORDER — MOXIFLOXACIN 5 MG/ML
1 SOLUTION/ DROPS OPHTHALMIC 4 TIMES DAILY
Qty: 3 ML | Refills: 0 | Status: SHIPPED | OUTPATIENT
Start: 2024-11-01 | End: 2024-11-08

## 2024-10-25 RX ORDER — PREDNISOLONE ACETATE 10 MG/ML
SUSPENSION/ DROPS OPHTHALMIC
Qty: 5 ML | Refills: 0 | Status: SHIPPED | OUTPATIENT
Start: 2024-11-01 | End: 2024-11-29

## 2024-10-25 RX ORDER — KETOROLAC TROMETHAMINE 5 MG/ML
SOLUTION OPHTHALMIC
Qty: 5 ML | Refills: 0 | Status: SHIPPED | OUTPATIENT
Start: 2024-10-30 | End: 2024-11-27

## 2024-10-28 ENCOUNTER — OFFICE VISIT (OUTPATIENT)
Dept: OPHTHALMOLOGY | Facility: CLINIC | Age: 64
End: 2024-10-28
Payer: COMMERCIAL

## 2024-10-28 DIAGNOSIS — H43.391 VITREOUS FLOATERS OF RIGHT EYE: Primary | ICD-10-CM

## 2024-10-28 PROCEDURE — 99024 POSTOP FOLLOW-UP VISIT: CPT | Performed by: OPHTHALMOLOGY

## 2024-10-28 ASSESSMENT — VISUAL ACUITY
METHOD_MR_RETINOSCOPY: 1
OS_CC: 20/30
METHOD: SNELLEN - LINEAR
OD_SC: 20/20
METHOD_MR: DX PURPOSES
OD_SC+: -2
OS_CC+: -1

## 2024-10-28 ASSESSMENT — REFRACTION_WEARINGRX
OS_SPHERE: -6.75
OS_ADD: +2.25
OD_AXIS: 001
OD_ADD: +2.25
OS_CYLINDER: +2.00
OS_AXIS: 175
OD_SPHERE: -6.25
SPECS_TYPE: PAL
OD_CYLINDER: +2.00

## 2024-10-28 ASSESSMENT — REFRACTION_MANIFEST
OD_SPHERE: -0.50
OD_CYLINDER: SPHERE
OD_ADD: +2.50

## 2024-10-28 ASSESSMENT — TONOMETRY
OS_IOP_MMHG: 14
IOP_METHOD: TONOPEN
OD_IOP_MMHG: 14

## 2024-10-28 ASSESSMENT — EXTERNAL EXAM - RIGHT EYE: OD_EXAM: NORMAL

## 2024-10-28 ASSESSMENT — SLIT LAMP EXAM - LIDS
COMMENTS: UPPER LID TRICHIASIS
COMMENTS: NORMAL

## 2024-10-28 ASSESSMENT — CUP TO DISC RATIO: OD_RATIO: 0.4

## 2024-10-28 ASSESSMENT — EXTERNAL EXAM - LEFT EYE: OS_EXAM: NORMAL

## 2024-10-28 NOTE — PROGRESS NOTES
HPI       Post Op (Ophthalmology) Right Eye    In right eye.  Since onset it is stable.  Associated symptoms include floaters.  Negative for eye pain and flashes.  Treatments tried include eye drops.             Comments    Here for post op right eye. [facility]. New couple floaters right eye without flashes. No eye pain.    Ja Duncan COT 2:59 PM October 28, 2024             Last edited by Ja Duncan on 10/28/2024  2:59 PM.         Review of systems for the eyes was negative other than the pertinent positives/negatives listed in the HPI.      Assessment & Plan    HPI:  Cecil Haddad is a 64 year old male with history of myopia, retinal dystrophy presents for Postoperative week 1 right eye. Noting floaters right eye       POHx: cone-nicholas dystrophy, Cataract extraction/intraocular lens   PMHx:   Current Medications:   Current Outpatient Medications   Medication Sig Dispense Refill    ASPIRIN 81 PO Take 1 tablet by mouth daily      [START ON 10/30/2024] ketorolac (ACULAR) 0.5 % ophthalmic solution Place 1 drop Into the left eye 4 times daily for 7 days, THEN 1 drop 3 times daily for 7 days, THEN 1 drop 2 times daily for 7 days, THEN 1 drop daily for 7 days. Start 2 days prior to surgery.. 5 mL 0    [START ON 11/1/2024] moxifloxacin (VIGAMOX) 0.5 % ophthalmic solution Place 1 drop Into the left eye 4 times daily for 7 days. Start after surgery. 3 mL 0    [START ON 11/1/2024] prednisoLONE acetate (PRED FORTE) 1 % ophthalmic suspension Place 1 drop Into the left eye 4 times daily for 7 days, THEN 1 drop 3 times daily for 7 days, THEN 1 drop 2 times daily for 7 days, THEN 1 drop daily for 7 days. Start after surgery.. 5 mL 0     No current facility-administered medications for this visit.     FHx: denies family history of ocular conditions   PSHx: Cataract extraction/intraocular lens Mclain right eye 10/18/24     Current Eye Medications:  pred forte/ketorolac three times a day        Assessment &  Plan:  Pseudophakia, right eye  Rayne right eye 10/18/24   Doing well    (H43.391) Vitreous floaters of right eye  (primary encounter diagnosis)  No holes or tears 360  Deferred scleral depression due to recent surgery      (H25.813) Combined forms of age-related cataract of both eyes    Special equipment/needs:  Eye: left  Anesthesia:topical  Dilates to: 8  Iris expansion:  No  Pseudoexfoliation: No  Trypan Blue: No  Trauma: No    Able lay to flat: Yes  Blood Thinner: Yes ASA 81  Tamsulosin: No  DM: No  Guttae: No    Dominant Eye: right    Plan: [place] both eyes     Proceed with CE/IOL left eye .      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        Johny Mclain MD     Attending Physician Attestation:  Complete documentation of historical and exam elements from today's encounter can be found in the full encounter summary report (not reduplicated in this progress note).  I personally obtained the chief complaint(s) and history of present illness.  I confirmed and edited as necessary the review of systems, past medical/surgical history, family history, social history, and examination findings as documented by others; and I examined the patient myself.  I personally reviewed the relevant tests, images, and reports as documented above.  I formulated and edited as necessary the assessment and plan and discussed the findings and management plan with the patient and family. - Johny Mclain MD

## 2024-10-29 ENCOUNTER — ANESTHESIA EVENT (OUTPATIENT)
Dept: SURGERY | Facility: AMBULATORY SURGERY CENTER | Age: 64
End: 2024-10-29
Payer: COMMERCIAL

## 2024-10-29 ASSESSMENT — LIFESTYLE VARIABLES: TOBACCO_USE: 1

## 2024-10-29 NOTE — ANESTHESIA PREPROCEDURE EVALUATION
Anesthesia Pre-Procedure Evaluation    Patient: Cecil Haddad   MRN: [id number] : 1960        Procedure : Procedure(s):  PHACOEMULSIFICATION, CATARACT, WITH INTRAOCULAR LENS IMPLANT          Past Medical History:   Diagnosis Date    H/O chronic pancreatitis 2006    History of renal cell carcinoma 2004    Nonsenile cataract     Recurrent acute pancreatitis 2007    5 episodes between  and 2010    Renal cancer, left (H) 2004    Tobacco use disorder, continuous 10/10/2024      Past Surgical History:   Procedure Laterality Date    NEPHRECTOMY RT/LT Left 2004    PHACOEMULSIFICATION CLEAR CORNEA WITH STANDARD INTRAOCULAR LENS IMPLANT Right 10/18/2024    Procedure: PHACOEMULSIFICATION, CATARACT, WITH INTRAOCULAR LENS IMPLANT;  Surgeon: Johny Mclain MD;  Location:  OR      No Known Allergies   Social History     Tobacco Use    Smoking status: Every Day     Current packs/day: 1.00     Average packs/day: 1 pack/day for 47.8 years (47.8 ttl pk-yrs)     Types: Cigarettes     Start date:     Smokeless tobacco: Current    Tobacco comments:     rare   Substance Use Topics    Alcohol use: Never      Wt Readings from Last 1 Encounters:   10/10/24 65.8 kg (145 lb 1.6 oz)        Anesthesia Evaluation   Pt has had prior anesthetic.     No history of anesthetic complications       ROS/MED HX  ENT/Pulmonary:     (+)                tobacco use, Current use, 1 packs/day,  patient smoked within 24 hours,                    Neurologic:  - neg neurologic ROS     Cardiovascular:  - neg cardiovascular ROS     METS/Exercise Tolerance:     Hematologic:  - neg hematologic  ROS     Musculoskeletal:  - neg musculoskeletal ROS     GI/Hepatic: Comment: H/O chronic pancreatitis - neg GI/hepatic ROS     Renal/Genitourinary:     (+) renal disease (hx of RCC),             Endo:  - neg endo ROS     Psychiatric/Substance Use:     (+)     Recreational drug usage: Cannabis.    Infectious Disease:  - neg  "infectious disease ROS     Malignancy:       Other:            Physical Exam    Airway  airway exam normal           Respiratory Devices and Support         Dental       (+) Minor Abnormalities - some fillings, tiny chips      Cardiovascular   cardiovascular exam normal          Pulmonary   pulmonary exam normal                OUTSIDE LABS:  CBC: No results found for: \"WBC\", \"HGB\", \"HCT\", \"PLT\"  BMP: No results found for: \"NA\", \"POTASSIUM\", \"CHLORIDE\", \"CO2\", \"BUN\", \"CR\", \"GLC\"  COAGS: No results found for: \"PTT\", \"INR\", \"FIBR\"  POC: No results found for: \"BGM\", \"HCG\", \"HCGS\"  HEPATIC: No results found for: \"ALBUMIN\", \"PROTTOTAL\", \"ALT\", \"AST\", \"GGT\", \"ALKPHOS\", \"BILITOTAL\", \"BILIDIRECT\", \"SAMMY\"  OTHER: No results found for: \"PH\", \"LACT\", \"A1C\", \"EMILY\", \"PHOS\", \"MAG\", \"LIPASE\", \"AMYLASE\", \"TSH\", \"T4\", \"T3\", \"CRP\", \"SED\"    Anesthesia Plan    ASA Status:  2    NPO Status:  NPO Appropriate    Anesthesia Type: MAC.     - Reason for MAC: straight local not clinically adequate   Induction: Intravenous.   Maintenance: TIVA.        Consents    Anesthesia Plan(s) and associated risks, benefits, and realistic alternatives discussed. Questions answered and patient/representative(s) expressed understanding.     - Discussed: Risks, Benefits and Alternatives for BOTH SEDATION and the PROCEDURE were discussed     - Discussed with:  Patient            Postoperative Care    Pain management: IV analgesics, Oral pain medications, Multi-modal analgesia.   PONV prophylaxis: Ondansetron (or other 5HT-3), Dexamethasone or Solumedrol     Comments:               Cholo Ellison MD    I have reviewed the pertinent notes and labs in the chart from the past 30 days and (re)examined the patient.  Any updates or changes from those notes are reflected in this note.                                 "

## 2024-11-01 ENCOUNTER — OFFICE VISIT (OUTPATIENT)
Dept: OPHTHALMOLOGY | Facility: CLINIC | Age: 64
End: 2024-11-01
Payer: COMMERCIAL

## 2024-11-01 ENCOUNTER — HOSPITAL ENCOUNTER (OUTPATIENT)
Facility: AMBULATORY SURGERY CENTER | Age: 64
Discharge: HOME | End: 2024-11-01
Attending: OPHTHALMOLOGY
Payer: COMMERCIAL

## 2024-11-01 ENCOUNTER — ANESTHESIA (OUTPATIENT)
Dept: SURGERY | Facility: AMBULATORY SURGERY CENTER | Age: 64
End: 2024-11-01
Payer: COMMERCIAL

## 2024-11-01 VITALS
OXYGEN SATURATION: 96 % | TEMPERATURE: 97 F | SYSTOLIC BLOOD PRESSURE: 97 MMHG | RESPIRATION RATE: 16 BRPM | DIASTOLIC BLOOD PRESSURE: 73 MMHG | HEART RATE: 58 BPM

## 2024-11-01 DIAGNOSIS — Z96.1 PSEUDOPHAKIA, LEFT EYE: Primary | ICD-10-CM

## 2024-11-01 DIAGNOSIS — H25.812 COMBINED FORM OF AGE-RELATED CATARACT, LEFT EYE: Primary | ICD-10-CM

## 2024-11-01 PROCEDURE — 66984 XCAPSL CTRC RMVL W/O ECP: CPT | Mod: 79 | Performed by: OPHTHALMOLOGY

## 2024-11-01 PROCEDURE — 66984 XCAPSL CTRC RMVL W/O ECP: CPT | Mod: LT

## 2024-11-01 PROCEDURE — G8907 PT DOC NO EVENTS ON DISCHARG: HCPCS

## 2024-11-01 PROCEDURE — 99024 POSTOP FOLLOW-UP VISIT: CPT | Performed by: OPHTHALMOLOGY

## 2024-11-01 PROCEDURE — 66984 XCAPSL CTRC RMVL W/O ECP: CPT | Performed by: NURSE ANESTHETIST, CERTIFIED REGISTERED

## 2024-11-01 PROCEDURE — 66984 XCAPSL CTRC RMVL W/O ECP: CPT | Performed by: ANESTHESIOLOGY

## 2024-11-01 PROCEDURE — G8918 PT W/O PREOP ORDER IV AB PRO: HCPCS

## 2024-11-01 DEVICE — TECNIS SIMPLICITY TECNIS EYHANCE U 15.5D
Type: IMPLANTABLE DEVICE | Site: EYE | Status: FUNCTIONAL
Brand: TECNIS SIMPLICITY

## 2024-11-01 RX ORDER — DEXAMETHASONE SODIUM PHOSPHATE 4 MG/ML
4 INJECTION, SOLUTION INTRA-ARTICULAR; INTRALESIONAL; INTRAMUSCULAR; INTRAVENOUS; SOFT TISSUE
Status: DISCONTINUED | OUTPATIENT
Start: 2024-11-01 | End: 2024-11-02 | Stop reason: HOSPADM

## 2024-11-01 RX ORDER — OXYCODONE HYDROCHLORIDE 5 MG/1
10 TABLET ORAL
Status: DISCONTINUED | OUTPATIENT
Start: 2024-11-01 | End: 2024-11-02 | Stop reason: HOSPADM

## 2024-11-01 RX ORDER — SODIUM CHLORIDE, SODIUM LACTATE, POTASSIUM CHLORIDE, CALCIUM CHLORIDE 600; 310; 30; 20 MG/100ML; MG/100ML; MG/100ML; MG/100ML
INJECTION, SOLUTION INTRAVENOUS CONTINUOUS
Status: DISCONTINUED | OUTPATIENT
Start: 2024-11-01 | End: 2024-11-01

## 2024-11-01 RX ORDER — ONDANSETRON 2 MG/ML
4 INJECTION INTRAMUSCULAR; INTRAVENOUS EVERY 30 MIN PRN
Status: DISCONTINUED | OUTPATIENT
Start: 2024-11-01 | End: 2024-11-02 | Stop reason: HOSPADM

## 2024-11-01 RX ORDER — FENTANYL CITRATE 50 UG/ML
25 INJECTION, SOLUTION INTRAMUSCULAR; INTRAVENOUS EVERY 5 MIN PRN
Status: DISCONTINUED | OUTPATIENT
Start: 2024-11-01 | End: 2024-11-02 | Stop reason: HOSPADM

## 2024-11-01 RX ORDER — TETRACAINE HYDROCHLORIDE 5 MG/ML
SOLUTION OPHTHALMIC PRN
Status: DISCONTINUED | OUTPATIENT
Start: 2024-11-01 | End: 2024-11-01 | Stop reason: HOSPADM

## 2024-11-01 RX ORDER — FENTANYL CITRATE 50 UG/ML
25 INJECTION, SOLUTION INTRAMUSCULAR; INTRAVENOUS
Status: DISCONTINUED | OUTPATIENT
Start: 2024-11-01 | End: 2024-11-02 | Stop reason: HOSPADM

## 2024-11-01 RX ORDER — PROPARACAINE HYDROCHLORIDE 5 MG/ML
1 SOLUTION/ DROPS OPHTHALMIC
Status: DISCONTINUED | OUTPATIENT
Start: 2024-11-01 | End: 2024-11-02 | Stop reason: HOSPADM

## 2024-11-01 RX ORDER — FENTANYL CITRATE 50 UG/ML
50 INJECTION, SOLUTION INTRAMUSCULAR; INTRAVENOUS EVERY 5 MIN PRN
Status: DISCONTINUED | OUTPATIENT
Start: 2024-11-01 | End: 2024-11-02 | Stop reason: HOSPADM

## 2024-11-01 RX ORDER — MOXIFLOXACIN IN NACL,ISO-OS/PF 0.3MG/0.3
SYRINGE (ML) INTRAOCULAR PRN
Status: DISCONTINUED | OUTPATIENT
Start: 2024-11-01 | End: 2024-11-01 | Stop reason: HOSPADM

## 2024-11-01 RX ORDER — ONDANSETRON 4 MG/1
4 TABLET, ORALLY DISINTEGRATING ORAL EVERY 30 MIN PRN
Status: DISCONTINUED | OUTPATIENT
Start: 2024-11-01 | End: 2024-11-02 | Stop reason: HOSPADM

## 2024-11-01 RX ORDER — BALANCED SALT SOLUTION 6.4; .75; .48; .3; 3.9; 1.7 MG/ML; MG/ML; MG/ML; MG/ML; MG/ML; MG/ML
SOLUTION OPHTHALMIC PRN
Status: DISCONTINUED | OUTPATIENT
Start: 2024-11-01 | End: 2024-11-01 | Stop reason: HOSPADM

## 2024-11-01 RX ORDER — TIMOLOL MALEATE 5 MG/ML
SOLUTION/ DROPS OPHTHALMIC PRN
Status: DISCONTINUED | OUTPATIENT
Start: 2024-11-01 | End: 2024-11-01 | Stop reason: HOSPADM

## 2024-11-01 RX ORDER — OXYCODONE HYDROCHLORIDE 5 MG/1
5 TABLET ORAL
Status: DISCONTINUED | OUTPATIENT
Start: 2024-11-01 | End: 2024-11-02 | Stop reason: HOSPADM

## 2024-11-01 RX ORDER — DICLOFENAC SODIUM 1 MG/ML
1 SOLUTION/ DROPS OPHTHALMIC
Status: COMPLETED | OUTPATIENT
Start: 2024-11-01 | End: 2024-11-01

## 2024-11-01 RX ORDER — SODIUM CHLORIDE, SODIUM LACTATE, POTASSIUM CHLORIDE, CALCIUM CHLORIDE 600; 310; 30; 20 MG/100ML; MG/100ML; MG/100ML; MG/100ML
INJECTION, SOLUTION INTRAVENOUS CONTINUOUS
Status: DISCONTINUED | OUTPATIENT
Start: 2024-11-01 | End: 2024-11-02 | Stop reason: HOSPADM

## 2024-11-01 RX ORDER — NALOXONE HYDROCHLORIDE 0.4 MG/ML
0.1 INJECTION, SOLUTION INTRAMUSCULAR; INTRAVENOUS; SUBCUTANEOUS
Status: DISCONTINUED | OUTPATIENT
Start: 2024-11-01 | End: 2024-11-02 | Stop reason: HOSPADM

## 2024-11-01 RX ORDER — CYCLOPENTOLAT/TROPIC/PHENYLEPH 1%-1%-2.5%
1 DROPS (EA) OPHTHALMIC (EYE)
Status: COMPLETED | OUTPATIENT
Start: 2024-11-01 | End: 2024-11-01

## 2024-11-01 RX ORDER — ACETAMINOPHEN 325 MG/1
975 TABLET ORAL ONCE
Status: COMPLETED | OUTPATIENT
Start: 2024-11-01 | End: 2024-11-01

## 2024-11-01 RX ORDER — POVIDONE-IODINE 5 %
SOLUTION, NON-ORAL OPHTHALMIC (EYE) PRN
Status: DISCONTINUED | OUTPATIENT
Start: 2024-11-01 | End: 2024-11-01 | Stop reason: HOSPADM

## 2024-11-01 RX ORDER — MOXIFLOXACIN 5 MG/ML
1 SOLUTION/ DROPS OPHTHALMIC
Status: COMPLETED | OUTPATIENT
Start: 2024-11-01 | End: 2024-11-01

## 2024-11-01 RX ORDER — LIDOCAINE 40 MG/G
CREAM TOPICAL
Status: DISCONTINUED | OUTPATIENT
Start: 2024-11-01 | End: 2024-11-02 | Stop reason: HOSPADM

## 2024-11-01 RX ADMIN — ACETAMINOPHEN 975 MG: 325 TABLET ORAL at 10:56

## 2024-11-01 RX ADMIN — SODIUM CHLORIDE, SODIUM LACTATE, POTASSIUM CHLORIDE, CALCIUM CHLORIDE: 600; 310; 30; 20 INJECTION, SOLUTION INTRAVENOUS at 11:37

## 2024-11-01 RX ADMIN — PROPARACAINE HYDROCHLORIDE 1 DROP: 5 SOLUTION/ DROPS OPHTHALMIC at 11:09

## 2024-11-01 RX ADMIN — Medication 1 DROP: at 11:16

## 2024-11-01 RX ADMIN — MOXIFLOXACIN 1 DROP: 5 SOLUTION/ DROPS OPHTHALMIC at 11:15

## 2024-11-01 RX ADMIN — DICLOFENAC SODIUM 1 DROP: 1 SOLUTION/ DROPS OPHTHALMIC at 11:18

## 2024-11-01 RX ADMIN — DICLOFENAC SODIUM 1 DROP: 1 SOLUTION/ DROPS OPHTHALMIC at 11:09

## 2024-11-01 RX ADMIN — PROPARACAINE HYDROCHLORIDE 1 DROP: 5 SOLUTION/ DROPS OPHTHALMIC at 11:15

## 2024-11-01 RX ADMIN — MOXIFLOXACIN 1 DROP: 5 SOLUTION/ DROPS OPHTHALMIC at 11:09

## 2024-11-01 RX ADMIN — Medication 1 DROP: at 11:09

## 2024-11-01 RX ADMIN — DICLOFENAC SODIUM 1 DROP: 1 SOLUTION/ DROPS OPHTHALMIC at 11:15

## 2024-11-01 RX ADMIN — MOXIFLOXACIN 1 DROP: 5 SOLUTION/ DROPS OPHTHALMIC at 11:19

## 2024-11-01 RX ADMIN — PROPARACAINE HYDROCHLORIDE 1 DROP: 5 SOLUTION/ DROPS OPHTHALMIC at 11:21

## 2024-11-01 RX ADMIN — Medication 1 DROP: at 11:18

## 2024-11-01 ASSESSMENT — TONOMETRY
OS_IOP_MMHG: 14
IOP_METHOD: TONOPEN

## 2024-11-01 ASSESSMENT — VISUAL ACUITY
METHOD: SNELLEN - LINEAR
OS_SC: 20/500

## 2024-11-01 ASSESSMENT — SLIT LAMP EXAM - LIDS: COMMENTS: NORMAL

## 2024-11-01 NOTE — PROGRESS NOTES
Review of systems for the eyes was negative other than the pertinent positives/negatives listed in the HPI.      Assessment & Plan    HPI:  Cecil Haddad is a 64 year old male with history of myopia, retinal dystrophy presents for Postoperative day 0 left eye       POHx: cone-nicholas dystrophy, Cataract extraction/intraocular lens   PMHx:   Current Medications:   Current Outpatient Medications   Medication Sig Dispense Refill    ASPIRIN 81 PO Take 1 tablet by mouth daily      ketorolac (ACULAR) 0.5 % ophthalmic solution Place 1 drop Into the left eye 4 times daily for 7 days, THEN 1 drop 3 times daily for 7 days, THEN 1 drop 2 times daily for 7 days, THEN 1 drop daily for 7 days. Start 2 days prior to surgery.. 5 mL 0    moxifloxacin (VIGAMOX) 0.5 % ophthalmic solution Place 1 drop Into the left eye 4 times daily for 7 days. Start after surgery. 3 mL 0    prednisoLONE acetate (PRED FORTE) 1 % ophthalmic suspension Place 1 drop Into the left eye 4 times daily for 7 days, THEN 1 drop 3 times daily for 7 days, THEN 1 drop 2 times daily for 7 days, THEN 1 drop daily for 7 days. Start after surgery.. 5 mL 0     No current facility-administered medications for this visit.     Facility-Administered Medications Ordered in Other Visits   Medication Dose Route Frequency Provider Last Rate Last Admin    dexAMETHasone (DECADRON) injection 4 mg  4 mg Intravenous Once PRN Cholo Ellison MD        dexAMETHasone (DECADRON) injection 4 mg  4 mg Intravenous Once PRN Cholo Ellison MD        fentaNYL (PF) (SUBLIMAZE) injection 25 mcg  25 mcg Intravenous Q5 Min PRN Cholo Ellison MD        fentaNYL (PF) (SUBLIMAZE) injection 25 mcg  25 mcg Intravenous Q15 Min PRN Cholo Ellison MD        fentaNYL (PF) (SUBLIMAZE) injection 50 mcg  50 mcg Intravenous Q5 Min PRN Cholo Ellison MD        HYDROmorphone (DILAUDID) injection 0.2 mg  0.2 mg Intravenous Q5 Min PRN Cholo Ellison MD        HYDROmorphone (DILAUDID) injection 0.4  mg  0.4 mg Intravenous Q5 Min PRN Cholo Ellison MD        lactated ringers infusion   Intravenous Continuous Cholo Ellison MD        lidocaine (LMX4) kit   Topical Q1H PRN Cholo Ellison MD        lidocaine 1 % 0.1-1 mL  0.1-1 mL Other Q1H PRN Cholo Ellison MD        naloxone (NARCAN) injection 0.1 mg  0.1 mg Intravenous Q2 Min PRN Cholo Ellison MD        naloxone (NARCAN) injection 0.1 mg  0.1 mg Intravenous Q2 Min PRN Cholo Ellison MD        ondansetron (ZOFRAN ODT) ODT tab 4 mg  4 mg Oral Q30 Min PRN Cholo Ellison MD        Or    ondansetron (ZOFRAN) injection 4 mg  4 mg Intravenous Q30 Min PRN Cholo Ellison MD        ondansetron (ZOFRAN ODT) ODT tab 4 mg  4 mg Oral Q30 Min PRN Cholo Ellison MD        Or    ondansetron (ZOFRAN) injection 4 mg  4 mg Intravenous Q30 Min PRN Cholo Ellison MD        oxyCODONE (ROXICODONE) tablet 10 mg  10 mg Oral Once PRN Cholo Ellison MD        oxyCODONE (ROXICODONE) tablet 5 mg  5 mg Oral Once PRN Cholo Ellison MD        prochlorperazine (COMPAZINE) injection 5 mg  5 mg Intravenous Q6H PRN Cholo Ellison MD        prochlorperazine (COMPAZINE) injection 5 mg  5 mg Intravenous Q6H PRN Cholo Ellison MD        proparacaine (ALCAINE) 0.5 % ophthalmic solution 1 drop  1 drop Ophthalmic q5 Min Prior to Surgery Johny Mclain MD   1 drop at 11/01/24 1121    sodium chloride (PF) 0.9% PF flush 3 mL  3 mL Intracatheter Q8H Cholo Ellison MD        sodium chloride (PF) 0.9% PF flush 3 mL  3 mL Intracatheter q1 min prn Cholo Elliosn MD         FHx: denies family history of ocular conditions   PSHx: Cataract extraction/intraocular lens Rayne right eye 10/18/24, left eye 11/01/24     Current Eye Medications:  pred forte/ketorolac two times a day      Assessment & Plan:  Pseudophakia, left eye, day 0  Mclain left eye 11/01/24   Doing well  Keep patch in place at night for 7 days  Start post-operative drops and taper according to  instructions  Post-operative do's and don'ts reviewed, questions answered    Recheck 1 week    Use AT every hour as needed     Pseudophakia, right eye  Mclain right eye 10/18/24   Doing well    (H43.391) Vitreous floaters of right eye  (primary encounter diagnosis)  No holes or tears 360  Deferred scleral depression due to recent surgery    (H52.13,  H52.203,  H52.4) Myopia of both eyes with astigmatism and presbyopia  Hold pending Cataract extraction/iol    (H04.122) Dry eye syndrome of left eye    (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        Johny Mclain MD     Attending Physician Attestation:  Complete documentation of historical and exam elements from today's encounter can be found in the full encounter summary report (not reduplicated in this progress note).  I personally obtained the chief complaint(s) and history of present illness.  I confirmed and edited as necessary the review of systems, past medical/surgical history, family history, social history, and examination findings as documented by others; and I examined the patient myself.  I personally reviewed the relevant tests, images, and reports as documented above.  I formulated and edited as necessary the assessment and plan and discussed the findings and management plan with the patient and family. - Johny Mclain MD

## 2024-11-01 NOTE — DISCHARGE INSTRUCTIONS
Tylenol 975 mg was given at 11am.    You should not take more then 4,000 mg of tylenol/acetaminophen in a 24 hour period.

## 2024-11-01 NOTE — ANESTHESIA CARE TRANSFER NOTE
Patient: Cecil Haddad    Procedure: Procedure(s):  PHACOEMULSIFICATION, CATARACT, WITH INTRAOCULAR LENS IMPLANT       Diagnosis: Combined forms of age-related cataract of both eyes [H25.813]  Diagnosis Additional Information: No value filed.    Anesthesia Type:   MAC     Note:    Oropharynx: oropharynx clear of all foreign objects and spontaneously breathing  Level of Consciousness: awake  Oxygen Supplementation: room air    Independent Airway: airway patency satisfactory and stable  Dentition: dentition unchanged  Vital Signs Stable: post-procedure vital signs reviewed and stable  Report to RN Given: handoff report given  Patient transferred to: Phase II    Handoff Report: Identifed the Patient, Identified the Reponsible Provider, Reviewed the pertinent medical history, Discussed the surgical course, Reviewed Intra-OP anesthesia mangement and issues during anesthesia, Set expectations for post-procedure period and Allowed opportunity for questions and acknowledgement of understanding      Vitals:  Vitals Value Taken Time   BP 90/63 11/01/24 1209   Temp 98  F (36.7  C) 11/01/24 1209   Pulse 60 11/01/24 1209   Resp 16 11/01/24 1209   SpO2 95 % 11/01/24 1209       Electronically Signed By: BETH Carlisle CRNA  November 1, 2024  12:11 PM

## 2024-11-01 NOTE — OP NOTE
PREOPERATIVE DIAGNOSIS:   1. Combined form of age-related cataract, left eye     Left eye   POSTOPERATIVE DIAGNOSIS: Same   PROCEDURES:   1. Cataract extraction with intraocular lens implant Left eye.  SURGEON: Johny Mclain M.D.  INDICATIONS: The patient Cecil Haddad presented to the eye clinic with decreased vision secondary to cataract in the Left eye. The risks, benefits and alternatives to cataract extraction were discussed. The patient elected to proceed. All questions were answered to the patient's satisfaction.   DESCRIPTION OF PROCEDURE:   Prior to the procedure, appropriate cardiac and respiratory monitors were applied to the patient.  The patient was brought to the operating room.  A drop of topical tetracaine was placed in the operative eye.  With adequate anesthesia, the Left eye was prepped and draped in the usual sterile fashion. A lid speculum was placed, and the operating microscope was rotated into position. A surgical pause was carried out to identify with all members of the surgical team the correct surgical site. A paracentesis was created.  Through this limbal paracentesis, the anterior chamber was filled with preservative-free lidocaine with epi shugarcaine followed by viscoelastic.  A temporal wound was created at the limbus using a 2.6 mm blade. A capsulorrhexis was initiated using a bent 25-gauge needle and was completed in continuous and circular fashion using the capsulorrhexis forceps. The lens nucleus was hydrodissected using balanced salt solution.  The lens nucleus was rotated and removed using phacoemulsification in a stop and chop technique.  Residual cortical material was removed using irrigation-aspiration.  The capsular bag was reinflated to its maximal extent with cohesive viscoelastic.  A 15.5 diopter DIB00 was inserted into the capsular bag.  The lens power selected was reviewed using the intraocular lens power measurements that were obtained preoperatively to confirm  that the correct lens was selected for the desired post-operative refractive state. A paired incision was made 180 degrees across from the main wound with the 2.6mm keratome. The residual viscoelastic was removed in its entirety, the wounds were hydrated and found to be self-sealing.  Intracameral moxifloxacin was administered. Tactile pressure was confirmed to be in a normal range.  The lid speculum was removed, a drop of timolol was administered, and a shield was applied.  The patient tolerated the procedure well, and there were no complications.    PLAN: The patient will be discharged to home and will follow up today  EBL:  None  Complications:  None  Implant Name Type Inv. Item Serial No.  Lot No. LRB No. Used Action   EYE IMP IOL TECNIS IOL DIB00 15.5 DIB00 15.5 - [id number] Lens/Eye Implant EYE IMP IOL TECNIS IOL DIB00 15.5 DIB00 15.5 [id number] [employer]  Left 1 Implanted

## 2024-11-01 NOTE — ANESTHESIA POSTPROCEDURE EVALUATION
Patient: Cecil Haddad    Procedure: Procedure(s):  PHACOEMULSIFICATION, CATARACT, WITH INTRAOCULAR LENS IMPLANT       Anesthesia Type:  MAC    Note:  Disposition: Outpatient   Postop Pain Control: Uneventful            Sign Out: Well controlled pain   PONV: No   Neuro/Psych: Uneventful            Sign Out: Acceptable/Baseline neuro status   Airway/Respiratory: Uneventful            Sign Out: Acceptable/Baseline resp. status   CV/Hemodynamics: Uneventful            Sign Out: Acceptable CV status; No obvious hypovolemia; No obvious fluid overload   Other NRE: NONE   DID A NON-ROUTINE EVENT OCCUR?            Last vitals:  Vitals Value Taken Time   BP 97/73 11/01/24 1222   Temp 97  F (36.1  C) 11/01/24 1222   Pulse 58 11/01/24 1222   Resp 16 11/01/24 1222   SpO2 96 % 11/01/24 1222       Electronically Signed By: Cholo Ellison MD  November 1, 2024  12:30 PM

## 2024-11-19 ENCOUNTER — OFFICE VISIT (OUTPATIENT)
Dept: OPHTHALMOLOGY | Facility: CLINIC | Age: 64
End: 2024-11-19
Payer: COMMERCIAL

## 2024-11-19 DIAGNOSIS — H52.13 MYOPIA OF BOTH EYES WITH ASTIGMATISM AND PRESBYOPIA: ICD-10-CM

## 2024-11-19 DIAGNOSIS — H52.203 MYOPIA OF BOTH EYES WITH ASTIGMATISM AND PRESBYOPIA: ICD-10-CM

## 2024-11-19 DIAGNOSIS — H52.4 MYOPIA OF BOTH EYES WITH ASTIGMATISM AND PRESBYOPIA: ICD-10-CM

## 2024-11-19 DIAGNOSIS — Z96.1 PSEUDOPHAKIA, BOTH EYES: Primary | ICD-10-CM

## 2024-11-19 PROCEDURE — 99024 POSTOP FOLLOW-UP VISIT: CPT | Performed by: OPHTHALMOLOGY

## 2024-11-19 RX ORDER — PREDNISOLONE ACETATE 10 MG/ML
1 SUSPENSION/ DROPS OPHTHALMIC
Qty: 10 ML | Refills: 1 | Status: SHIPPED | OUTPATIENT
Start: 2024-11-19 | End: 2024-11-26

## 2024-11-19 ASSESSMENT — VISUAL ACUITY
OD_SC+: -2
METHOD: SNELLEN - LINEAR
OS_SC: 20/200
OD_SC: 20/30
METHOD_MR_RETINOSCOPY: 1

## 2024-11-19 ASSESSMENT — CUP TO DISC RATIO
OD_RATIO: 0.4
OS_RATIO: 0.4

## 2024-11-19 ASSESSMENT — EXTERNAL EXAM - LEFT EYE: OS_EXAM: NORMAL

## 2024-11-19 ASSESSMENT — SLIT LAMP EXAM - LIDS
COMMENTS: UPPER LID TRICHIASIS
COMMENTS: NORMAL

## 2024-11-19 ASSESSMENT — REFRACTION_MANIFEST
OS_SPHERE: -1.25
OS_CYLINDER: SPHERE
OS_ADD: +2.50
OD_ADD: +2.50
OD_CYLINDER: +0.50
OD_SPHERE: -1.00
OD_AXIS: 010

## 2024-11-19 ASSESSMENT — TONOMETRY
IOP_METHOD: TONOPEN
OD_IOP_MMHG: 9
OS_IOP_MMHG: 8

## 2024-11-19 ASSESSMENT — EXTERNAL EXAM - RIGHT EYE: OD_EXAM: NORMAL

## 2024-11-19 NOTE — PROGRESS NOTES
HPI       Post Op (Ophthalmology) Both Eyes    In both eyes.  Since onset it is stable.  Associated symptoms include Negative for eye pain, flashes and floaters.  Treatments tried include eye drops.             Comments    Here for post op both eyes. [facility]. Compliant with drops. No pain. No flashes or floaters.    Ja Duncan COT 10:00 AM November 19, 2024             Last edited by Ja Duncan on 11/19/2024 10:00 AM.           Review of systems for the eyes was negative other than the pertinent positives/negatives listed in the HPI.      Assessment & Plan    HPI:  Cecil Haddad is a 64 year old male with history of myopia, retinal dystrophy presents for Postoperative week 2 left eye. Missed 5 days right eye drops while inpatient      POHx: cone-nicholas dystrophy, Cataract extraction/intraocular lens   PMHx:   Current Medications:   Current Outpatient Medications   Medication Sig Dispense Refill    prednisoLONE acetate (PRED FORTE) 1 % ophthalmic suspension Place 1 drop Into the left eye every 2 hours (while awake) for 7 days. Start after surgery. 10 mL 1    ASPIRIN 81 PO Take 1 tablet by mouth daily      ketorolac (ACULAR) 0.5 % ophthalmic solution Place 1 drop Into the left eye 4 times daily for 7 days, THEN 1 drop 3 times daily for 7 days, THEN 1 drop 2 times daily for 7 days, THEN 1 drop daily for 7 days. Start 2 days prior to surgery.. 5 mL 0     No current facility-administered medications for this visit.     FHx: denies family history of ocular conditions   PSHx: Cataract extraction/intraocular lens Mclain right eye 10/18/24, left eye 11/01/24     Current Eye Medications:  pred forte/ketorolac two times a day      Assessment & Plan:  Pseudophakia, left eye  Mclain left eye 11/01/24   Doing well  Lots of rebound inflammation today  Increase pred forte every two hours while awake    Pseudophakia, right eye  Mclain right eye 10/18/24   Doing well    (H43.391) Vitreous floaters of right eye  (primary  encounter diagnosis)  No holes or tears 360  Deferred scleral depression due to recent surgery    (H52.13,  H52.203,  H52.4) Myopia of both eyes with astigmatism and presbyopia  Hold pending Cataract extraction/iol    (H04.122) Dry eye syndrome of left eye    (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        Johny Mclain MD     Attending Physician Attestation:  Complete documentation of historical and exam elements from today's encounter can be found in the full encounter summary report (not reduplicated in this progress note).  I personally obtained the chief complaint(s) and history of present illness.  I confirmed and edited as necessary the review of systems, past medical/surgical history, family history, social history, and examination findings as documented by others; and I examined the patient myself.  I personally reviewed the relevant tests, images, and reports as documented above.  I formulated and edited as necessary the assessment and plan and discussed the findings and management plan with the patient and family. - Johny Mclain MD

## 2024-11-25 ENCOUNTER — OFFICE VISIT (OUTPATIENT)
Dept: OPHTHALMOLOGY | Facility: CLINIC | Age: 64
End: 2024-11-25
Payer: COMMERCIAL

## 2024-11-25 DIAGNOSIS — Z96.1 PSEUDOPHAKIA, LEFT EYE: ICD-10-CM

## 2024-11-25 DIAGNOSIS — H20.9 UVEITIS OF LEFT EYE: ICD-10-CM

## 2024-11-25 DIAGNOSIS — Z96.1 PSEUDOPHAKIA, BOTH EYES: Primary | ICD-10-CM

## 2024-11-25 PROCEDURE — 99024 POSTOP FOLLOW-UP VISIT: CPT | Performed by: OPHTHALMOLOGY

## 2024-11-25 RX ORDER — MAGNESIUM OXIDE 400 MG/1
400 TABLET ORAL DAILY
COMMUNITY
Start: 2024-11-17

## 2024-11-25 RX ORDER — POTASSIUM CHLORIDE 1500 MG/1
40 TABLET, EXTENDED RELEASE ORAL
COMMUNITY
Start: 2024-11-16

## 2024-11-25 RX ORDER — KETOROLAC TROMETHAMINE 5 MG/ML
1 SOLUTION OPHTHALMIC 4 TIMES DAILY
Qty: 5 ML | Refills: 3 | Status: SHIPPED | OUTPATIENT
Start: 2024-11-25

## 2024-11-25 RX ORDER — DIFLUPREDNATE OPHTHALMIC 0.5 MG/ML
1 EMULSION OPHTHALMIC 4 TIMES DAILY
Qty: 5 ML | Refills: 3 | Status: SHIPPED | OUTPATIENT
Start: 2024-11-25

## 2024-11-25 ASSESSMENT — VISUAL ACUITY
OS_SC: 20/150
OS_SC+: +1
OD_SC+: -2
METHOD: SNELLEN - LINEAR
OS_PH_SC: 20/125
OD_SC: 20/40

## 2024-11-25 ASSESSMENT — GONIOSCOPY: METHOD: SUSSMAN, FOUR MIRROR

## 2024-11-25 ASSESSMENT — EXTERNAL EXAM - LEFT EYE: OS_EXAM: NORMAL

## 2024-11-25 ASSESSMENT — CUP TO DISC RATIO
OD_RATIO: 0.4
OS_RATIO: 0.4

## 2024-11-25 ASSESSMENT — SLIT LAMP EXAM - LIDS
COMMENTS: NORMAL
COMMENTS: UPPER LID TRICHIASIS

## 2024-11-25 ASSESSMENT — TONOMETRY
IOP_METHOD: TONOPEN
OD_IOP_MMHG: 12
OS_IOP_MMHG: 13

## 2024-11-25 ASSESSMENT — EXTERNAL EXAM - RIGHT EYE: OD_EXAM: NORMAL

## 2024-11-25 NOTE — NURSING NOTE
Chief Complaints and History of Present Illnesses   Patient presents with    Post Op (Ophthalmology) Left Eye       Chief Complaint(s) and History of Present Illness(es)       Post Op (Ophthalmology) Left Eye              Laterality: left eye              Comments    Patient is here today for a post op with rebound inflammation left eye. He states today is the best he has seen since surgery on 11/01/2024 (right eye was 10/18/2024). Patient is noticing new dark spots in the vision left eye in his peripheral vision. No flashing lights. No ocular pain or discomfort.   Ocular Medications: Prednisolone left eye every 2 hours and sometimes once a day in the right eye.                    Keily Hawkins, COA

## 2024-11-25 NOTE — PROGRESS NOTES
HPI       Post Op (Ophthalmology) Left Eye    In left eye.             Comments    Patient is here today for a post op with rebound inflammation left eye. He states today is the best he has seen since surgery on 11/01/2024 (right eye was 10/18/2024). Patient is noticing new dark spots in the vision left eye in his peripheral vision. No flashing lights. No ocular pain or discomfort.   Ocular Medications: Prednisolone left eye every 2 hours and sometimes once a day in the right eye.           Last edited by Keily Hawkins on 11/25/2024  3:15 PM.           Review of systems for the eyes was negative other than the pertinent positives/negatives listed in the HPI.      Assessment & Plan    HPI:  Cecil Haddad is a 64 year old male with history of myopia, retinal dystrophy presents for Postoperative week 3 left eye. Missed 5 days right eye drops while inpatient. He has been using pred forte every two hours while awake      POHx: cone-nicholas dystrophy, Cataract extraction/intraocular lens   PMHx:   Current Medications:   Current Outpatient Medications   Medication Sig Dispense Refill    difluprednate (DUREZOL) 0.05 % ophthalmic emulsion Place 1 drop Into the left eye 4 times daily. 5 mL 3    ketorolac (ACULAR) 0.5 % ophthalmic solution Place 1 drop Into the left eye 4 times daily. 5 mL 3    magnesium oxide (MAG-OX) 400 MG tablet Take 400 mg by mouth daily.      potassium chloride nancy ER (KLOR-CON M20) 20 MEQ CR tablet Take 40 mEq by mouth.      prednisoLONE acetate (PRED FORTE) 1 % ophthalmic suspension Place 1 drop Into the left eye every 2 hours (while awake) for 7 days. Start after surgery. 10 mL 1    ASPIRIN 81 PO Take 1 tablet by mouth daily (Patient not taking: Reported on 11/25/2024)      ketorolac (ACULAR) 0.5 % ophthalmic solution Place 1 drop Into the left eye 4 times daily for 7 days, THEN 1 drop 3 times daily for 7 days, THEN 1 drop 2 times daily for 7 days, THEN 1 drop daily for 7 days. Start 2 days prior to  surgery.. (Patient not taking: Reported on 11/25/2024) 5 mL 0     No current facility-administered medications for this visit.     FHx: denies family history of ocular conditions   PSHx: Cataract extraction/intraocular lens Rayne right eye 10/18/24, left eye 11/01/24     Current Eye Medications:  Pred forte every two hours while awake      Assessment & Plan:  Pseudophakia, left eye  Mclain left eye 11/01/24   Doing well  Lots of rebound inflammation today  No retained fragment on gonioscopy  Change pred forte to durezol four times a day and restart ketorlac four times a day    Ensure patient shaking bottle to receive steroid  If no improvement next visit, will send to retina to consider tap    Pseudophakia, right eye  Rayne right eye 10/18/24   Doing well    (H43.391) Vitreous floaters of right eye  (primary encounter diagnosis)  No holes or tears 360  Deferred scleral depression due to recent surgery    (H52.13,  H52.203,  H52.4) Myopia of both eyes with astigmatism and presbyopia  Hold pending Cataract extraction/iol    (H04.122) Dry eye syndrome of left eye    (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        Johny Mclain MD     Attending Physician Attestation:  Complete documentation of historical and exam elements from today's encounter can be found in the full encounter summary report (not reduplicated in this progress note).  I personally obtained the chief complaint(s) and history of present illness.  I confirmed and edited as necessary the review of systems, past medical/surgical history, family history, social history, and examination findings as documented by others; and I examined the patient myself.  I personally reviewed the relevant tests, images, and reports as documented above.  I formulated and edited as necessary  the assessment and plan and discussed the findings and management plan with the patient and family. - Johny Mclain MD

## 2024-11-27 ENCOUNTER — TELEPHONE (OUTPATIENT)
Dept: OPHTHALMOLOGY | Facility: CLINIC | Age: 64
End: 2024-11-27
Payer: COMMERCIAL

## 2024-11-27 NOTE — TELEPHONE ENCOUNTER
PRIOR AUTHORIZATION DENIED    Medication: DIFLUPREDNATE 0.05 % OP EMUL  Insurance Company: [employer] - Phone [phone] Fax [phone]  Denial Date: 11/27/2024  Denial Reason(s): Needs to try/fail FML first  Appeal Information: N/A  Patient Notified: No

## 2024-11-27 NOTE — TELEPHONE ENCOUNTER
Prior Authorization Retail Medication Request    Medication/Dose: difluprednate (DUREZOL) 0.05 % ophthalmic emulsion  Diagnosis and ICD code (if different than what is on RX):  H20.9 Uveitis of left eye  New/renewal/insurance change PA/secondary ins. PA:  Previously Tried and Failed:  Pred Forte  Rationale:  Patient continues to suffer inflammation and swelling. It is medically necessary for patient to start this drop.    Insurance   Primary: see chart  Insurance ID:  see chart    Secondary (if applicable): see chart  Insurance ID:  see chart    Pharmacy Information (if different than what is on RX)  Name:  see chart  Phone:  see chart  Fax: see chart    Clinic Information  Preferred routing pool for dept communication: [facility]

## 2024-11-27 NOTE — TELEPHONE ENCOUNTER
PA Initiation    Medication: DIFLUPREDNATE 0.05 % OP EMUL  Insurance Company: [employer] - Phone [phone] Fax [phone]  Pharmacy Filling the Rx: [id number] [place] [place] [place] - [address] AT [facility]  Filling Pharmacy Phone: [phone]  Filling Pharmacy Fax:    Start Date: 11/27/2024

## 2024-12-03 ENCOUNTER — OFFICE VISIT (OUTPATIENT)
Dept: OPHTHALMOLOGY | Facility: CLINIC | Age: 64
End: 2024-12-03
Payer: COMMERCIAL

## 2024-12-03 DIAGNOSIS — H20.9 UVEITIS OF LEFT EYE: Primary | ICD-10-CM

## 2024-12-03 PROCEDURE — 99213 OFFICE O/P EST LOW 20 MIN: CPT | Mod: 24 | Performed by: OPHTHALMOLOGY

## 2024-12-03 RX ORDER — FLUOROMETHOLONE 1 MG/ML
1 SUSPENSION/ DROPS OPHTHALMIC 4 TIMES DAILY
Qty: 5 ML | Refills: 0 | Status: SHIPPED | OUTPATIENT
Start: 2024-12-03

## 2024-12-03 ASSESSMENT — VISUAL ACUITY
OS_SC: 20/200
METHOD: SNELLEN - LINEAR
OD_SC: 20/40
OD_SC+: -2

## 2024-12-03 ASSESSMENT — GONIOSCOPY: METHOD: SUSSMAN, FOUR MIRROR

## 2024-12-03 ASSESSMENT — TONOMETRY
IOP_METHOD: TONOPEN
OD_IOP_MMHG: 8
OS_IOP_MMHG: 10

## 2024-12-03 ASSESSMENT — REFRACTION_MANIFEST
OD_ADD: +2.50
OD_AXIS: 017
OD_SPHERE: -0.75
OD_CYLINDER: +0.75

## 2024-12-03 ASSESSMENT — EXTERNAL EXAM - LEFT EYE: OS_EXAM: NORMAL

## 2024-12-03 ASSESSMENT — EXTERNAL EXAM - RIGHT EYE: OD_EXAM: NORMAL

## 2024-12-03 ASSESSMENT — SLIT LAMP EXAM - LIDS
COMMENTS: NORMAL
COMMENTS: UPPER LID TRICHIASIS

## 2024-12-03 NOTE — PROGRESS NOTES
HPI       Post Op (Ophthalmology) Left Eye    In left eye.  Since onset it is stable.  Associated symptoms include floaters.  Negative for eye pain and flashes.  Treatments tried include eye drops.             Comments    Here for follow up. [facility] is about the same. Stable floaters without flashes. Compliant with drops q2h. No pain.    Ja Duncan COT 1:36 PM December 3, 2024             Last edited by Ja Duncan on 12/3/2024  1:36 PM.           Review of systems for the eyes was negative other than the pertinent positives/negatives listed in the HPI.      Assessment & Plan    HPI:  Cecil Haddad is a 64 year old male with history of myopia, retinal dystrophy presents for Postoperative week 4 eft eye. Missed 5 days right eye drops while inpatient. He has been using pred forte every two hours and ketorolac four times a day . He was unable to get durezol due to insurance purposes      POHx: cone-nicholas dystrophy, Cataract extraction/intraocular lens   PMHx:   Current Medications:   Current Outpatient Medications   Medication Sig Dispense Refill    fluorometholone (FML LIQUIFILM) 0.1 % ophthalmic suspension Place 1 drop Into the left eye 4 times daily. 5 mL 0    ASPIRIN 81 PO Take 1 tablet by mouth daily (Patient not taking: Reported on 11/25/2024)      difluprednate (DUREZOL) 0.05 % ophthalmic emulsion Place 1 drop Into the left eye 4 times daily. 5 mL 3    ketorolac (ACULAR) 0.5 % ophthalmic solution Place 1 drop Into the left eye 4 times daily. 5 mL 3    magnesium oxide (MAG-OX) 400 MG tablet Take 400 mg by mouth daily.      potassium chloride nancy ER (KLOR-CON M20) 20 MEQ CR tablet Take 40 mEq by mouth.       No current facility-administered medications for this visit.     FHx: denies family history of ocular conditions   PSHx: Cataract extraction/intraocular lens Mclain right eye 10/18/24, left eye 11/01/24     Current Eye Medications:  Pred forte every two hours while awake      Assessment & Plan:  Pseudophakia, left  eye  Rayne left eye 11/01/24   Continued rebound inflammation today  No retained fragment on gonioscopy again today 12/03/24  Insurance requires FML trial prior to durezol  FML prescribed today  Use pred forte and FML every hour while awake  Will arrange for retina followup to consider possible low grade infection like p acnes, consider tap and inject    Pseudophakia, right eye  Rayne right eye 10/18/24   Doing well  MRx dispensed today for right eye     (H43.391) Vitreous floaters of right eye  (primary encounter diagnosis)  No holes or tears 360    (H52.13,  H52.203,  H52.4) Myopia of both eyes with astigmatism and presbyopia  Manifest refraction dispensed today for right eye     (H04.122) Dry eye syndrome of left eye    (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        Johny Mclain MD     Attending Physician Attestation:  Complete documentation of historical and exam elements from today's encounter can be found in the full encounter summary report (not reduplicated in this progress note).  I personally obtained the chief complaint(s) and history of present illness.  I confirmed and edited as necessary the review of systems, past medical/surgical history, family history, social history, and examination findings as documented by others; and I examined the patient myself.  I personally reviewed the relevant tests, images, and reports as documented above.  I formulated and edited as necessary the assessment and plan and discussed the findings and management plan with the patient and family. - Johny Mclain MD

## 2024-12-04 ENCOUNTER — OFFICE VISIT (OUTPATIENT)
Dept: OPHTHALMOLOGY | Facility: CLINIC | Age: 64
End: 2024-12-04
Attending: OPHTHALMOLOGY
Payer: COMMERCIAL

## 2024-12-04 DIAGNOSIS — H20.9 UVEITIS OF LEFT EYE: Primary | ICD-10-CM

## 2024-12-04 PROCEDURE — 76512 OPH US DX B-SCAN: CPT | Performed by: OPHTHALMOLOGY

## 2024-12-04 ASSESSMENT — TONOMETRY
OD_IOP_MMHG: 15
OS_IOP_MMHG: 17
IOP_METHOD: TONOPEN

## 2024-12-04 ASSESSMENT — VISUAL ACUITY
METHOD: SNELLEN - LINEAR
OD_SC: 20/40
OD_SC+: -2
OS_SC: 20/250

## 2024-12-04 NOTE — Clinical Note
I am sorry, when I was looking for this patient  I was told he left.  Ultrasound showed  vitreous debri concerning for vitritis w/ PVD.  If worsening needs to see retina

## 2024-12-04 NOTE — NURSING NOTE
"Chief Complaints and History of Present Illnesses   Patient presents with    Retinal Evaluation     Referral from Dr. Mclain for retinal evaluation. Hx Rebound inflammation left eye, Vitreous floaters right eye, and Cone-nicholas dystrophy.     Per patient's wife, \"Can he have his eye drained today? One of his problems is extra fluid build up in his eye. Can he just get it drained? Dr. Mclain said his left eye was so swollen that he wanted a retina specialist to look at it today.\"      Chief Complaint(s) and History of Present Illness(es)       Retinal Evaluation              Laterality: both eyes    Onset: years ago    Quality: blurred vision    Associated symptoms: tearing, photophobia, floaters (OU) and swelling (\"OS is swollen\").  Negative for eye pain, redness and flashes    Treatments tried: eye drops and glasses    Pain scale: 0/10    Comments: Referral from Dr. Mclain for retinal evaluation. Hx Rebound inflammation left eye, Vitreous floaters right eye, and Cone-nicholas dystrophy.     Per patient's wife, \"Can he have his eye drained today? One of his problems is extra fluid build up in his eye. Can he just get it drained? Dr. Mclain said his left eye was so swollen that he wanted a retina specialist to look at it today.\"               Comments    Patient notes his vision is very blurry in left eye. Onset is since surgery on 11/1/24.     Ocular meds:   - \"Pink cap\" QID   - \"Hsook cap\" \"every couple hours    BRUNA Grace 2:18 PM 12/04/2024                     "

## 2024-12-05 NOTE — PROGRESS NOTES
12/04/24 patient left without  MD evaluation  Ultrasound: vitreous debri concerning for vitritis w/ PVD.   ~~~~~~~~~~~~~~~~~~~~~~~~~~~~~~~~~~      Kim Gallego MD  Professor of Ophthalmology  Vitreo-Retinal surgeon   Department of Ophthalmology and Visual Neurosciences   [facility]  Phone: [phone]   Fax: [phone]

## 2024-12-09 ENCOUNTER — OFFICE VISIT (OUTPATIENT)
Dept: OPHTHALMOLOGY | Facility: CLINIC | Age: 64
End: 2024-12-09
Payer: COMMERCIAL

## 2024-12-09 DIAGNOSIS — H20.9 UVEITIS OF LEFT EYE: ICD-10-CM

## 2024-12-09 DIAGNOSIS — Z96.1 PSEUDOPHAKIA, BOTH EYES: Primary | ICD-10-CM

## 2024-12-09 PROCEDURE — 99024 POSTOP FOLLOW-UP VISIT: CPT | Performed by: OPHTHALMOLOGY

## 2024-12-09 RX ORDER — FLUOROMETHOLONE 1 MG/ML
1 SUSPENSION/ DROPS OPHTHALMIC 4 TIMES DAILY
Qty: 5 ML | Refills: 2 | Status: SHIPPED | OUTPATIENT
Start: 2024-12-09

## 2024-12-09 ASSESSMENT — VISUAL ACUITY
OD_SC: 20/30
OS_SC+: +1
METHOD: SNELLEN - LINEAR
OS_SC: 20/150

## 2024-12-09 ASSESSMENT — EXTERNAL EXAM - LEFT EYE: OS_EXAM: NORMAL

## 2024-12-09 ASSESSMENT — TONOMETRY
IOP_METHOD: ICARE
OS_IOP_MMHG: 12
OD_IOP_MMHG: 9

## 2024-12-09 ASSESSMENT — SLIT LAMP EXAM - LIDS
COMMENTS: NORMAL
COMMENTS: UPPER LID TRICHIASIS

## 2024-12-09 ASSESSMENT — EXTERNAL EXAM - RIGHT EYE: OD_EXAM: NORMAL

## 2024-12-09 NOTE — PROGRESS NOTES
HPI       Post Op (Ophthalmology) Both Eyes    In both eyes.             Comments    Patient returns for post-op cataract surgery of his left eye.  (10/18/2024 right eye) 11/01/2024 left eye. He does not feel like his vision has improved at all since he was here last. He's had floaters  in the left eye and he's not sure if they are worse or if he's just noticing them more. Today he mentions flashing lights but states he's had them all along but didn't mention it as it wasn't bothering him, he thought it was just from looking at bright light and then turning away. They only last about a second. Patient did not see Dr. Gallego on 12/04/2024 as it was taking too long and he needed to be able to drive home before dark. He did have the ultrasound: vitreous debris concerning for vitreitis with PVD. He did not have an FA. He does have an appointment at 9:30 am on January 16th, 2025.  He has no other ocular concerns today.   Ocular Medications: FML every couple of hours left eye.  Ketoroloac left eye four times per day  (needs refill if continuing)   When asked about aspirin patient said he didn't take it then said he took regular Barer aspirin but not sure what the dosage is.           Last edited by Keily Hawkins on 12/9/2024  3:46 PM.           Review of systems for the eyes was negative other than the pertinent positives/negatives listed in the HPI.      Assessment & Plan    HPI:  Cecil Haddad is a 64 year old male with history of myopia, retinal dystrophy presents for Postoperative week 5 left eye. Missed 5 days right eye drops while inpatient. He has been using pred forte every two hours and ketorolac four times a day and FML qid.     He went to [facility] for retina evaluation and was told that it would be 4-5 additional hours after he had already been waiting 2.5 hours. Willing to go back if needed, but needs am appointment.     He was unable to get durezol due to insurance purposes      POHx: cone-nicholas dystrophy,  Cataract extraction/intraocular lens   PMHx:   Current Medications:   Current Outpatient Medications   Medication Sig Dispense Refill    ASPIRIN 81 PO Take 1 tablet by mouth daily.      difluprednate (DUREZOL) 0.05 % ophthalmic emulsion Place 1 drop Into the left eye 4 times daily. 5 mL 3    fluorometholone (FML LIQUIFILM) 0.1 % ophthalmic suspension Place 1 drop Into the left eye 4 times daily. 5 mL 2    ketorolac (ACULAR) 0.5 % ophthalmic solution Place 1 drop Into the left eye 4 times daily. 5 mL 3    magnesium oxide (MAG-OX) 400 MG tablet Take 400 mg by mouth daily.      potassium chloride nancy ER (KLOR-CON M20) 20 MEQ CR tablet Take 40 mEq by mouth.       No current facility-administered medications for this visit.     FHx: denies family history of ocular conditions   PSHx: Cataract extraction/intraocular lens Mclain right eye 10/18/24, left eye 11/01/24     Current Eye Medications:  Ketorolac four times a day   Pred forte every two hours left eye   FML four times a day left eye     Assessment & Plan:  Pseudophakia, left eye  Mclain left eye 11/01/24   Continued rebound inflammation today but improved compared to previous  No retained fragment on gonioscopy again today 12/03/24  Insurance requires FML trial prior to durezol  FML showing modest improvement  Use pred forte every hour while awake and FML four times a day    Continue ketorolac four times a day      May have had ineffective pred acetate bottle or confusion with drop administration  Looking much better today    Still consider possible low grade infection like p acnes    Pseudophakia, right eye  Mclain right eye 10/18/24   Doing well  MRx dispensed previously for right eye     (H43.391) Vitreous floaters of right eye  (primary encounter diagnosis)  No holes or tears 360    (H52.13,  H52.203,  H52.4) Myopia of both eyes with astigmatism and presbyopia  Manifest refraction dispensed today for right eye     (H04.122) Dry eye syndrome of left  eye    (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        Johny Mclain MD     Attending Physician Attestation:  Complete documentation of historical and exam elements from today's encounter can be found in the full encounter summary report (not reduplicated in this progress note).  I personally obtained the chief complaint(s) and history of present illness.  I confirmed and edited as necessary the review of systems, past medical/surgical history, family history, social history, and examination findings as documented by others; and I examined the patient myself.  I personally reviewed the relevant tests, images, and reports as documented above.  I formulated and edited as necessary the assessment and plan and discussed the findings and management plan with the patient and family. - Johny Mclain MD

## 2024-12-09 NOTE — NURSING NOTE
Chief Complaints and History of Present Illnesses   Patient presents with    Post Op (Ophthalmology) Both Eyes       Chief Complaint(s) and History of Present Illness(es)       Post Op (Ophthalmology) Both Eyes              Laterality: both eyes              Comments    Patient returns for post-op cataract surgery of his left eye.  (10/18/2024 right eye) 11/01/2024 left eye. He does not feel like his vision has improved at all since he was here last. He's had floaters  in the left eye and he's not sure if they are worse or if he's just noticing them more. Today he mentions flashing lights but states he's had them all along but didn't mention it as it wasn't bothering him, he thought it was just from looking at bright light and then turning away. They only last about a second. Patient did not see Dr. Gallego on 12/04/2024 as it was taking too long and he needed to be able to drive home before dark. He did have the ultrasound: vitreous debris concerning for vitreitis with PVD. He did not have an FA. He does have an appointment at 9:30 am on January 16th, 2025.  He has no other ocular concerns today.   Ocular Medications: FML every couple of hours left eye.  Ketoroloac left eye four times per day  (needs refill if continuing)   When asked about aspirin patient said he didn't take it then said he took regular Barer aspirin but not sure what the dosage is.                    Keily Hawkins, COA

## 2024-12-16 ENCOUNTER — OFFICE VISIT (OUTPATIENT)
Dept: OPHTHALMOLOGY | Facility: CLINIC | Age: 64
End: 2024-12-16
Payer: COMMERCIAL

## 2024-12-16 DIAGNOSIS — H35.50 RETINAL DYSTROPHY: ICD-10-CM

## 2024-12-16 DIAGNOSIS — Z96.1 PSEUDOPHAKIA, BOTH EYES: ICD-10-CM

## 2024-12-16 DIAGNOSIS — H20.9 UVEITIS OF LEFT EYE: Primary | ICD-10-CM

## 2024-12-16 PROCEDURE — 99024 POSTOP FOLLOW-UP VISIT: CPT | Performed by: OPHTHALMOLOGY

## 2024-12-16 ASSESSMENT — TONOMETRY
IOP_METHOD: TONOPEN
OD_IOP_MMHG: 10
OS_IOP_MMHG: 14

## 2024-12-16 ASSESSMENT — SLIT LAMP EXAM - LIDS
COMMENTS: UPPER LID TRICHIASIS
COMMENTS: NORMAL

## 2024-12-16 ASSESSMENT — VISUAL ACUITY
METHOD: SNELLEN - LINEAR
OS_SC: 20/100
OD_SC: 20/30

## 2024-12-16 ASSESSMENT — EXTERNAL EXAM - RIGHT EYE: OD_EXAM: NORMAL

## 2024-12-16 ASSESSMENT — CUP TO DISC RATIO: OS_RATIO: 0.4

## 2024-12-16 ASSESSMENT — EXTERNAL EXAM - LEFT EYE: OS_EXAM: NORMAL

## 2024-12-16 NOTE — PROGRESS NOTES
HPI       Follow Up    In both eyes.  Since onset it is stable.  Associated symptoms include floaters.  Negative for eye pain and flashes.  Treatments tried include eye drops.             Comments    Here for follow up. [facility] is about the same. Stable floaters without flashes. No eye pain. Compliant with drops - been using PF [id number] since running low.    Ja Duncan COT 3:15 PM December 16, 2024             Last edited by Ja Duncan on 12/16/2024  3:15 PM.           Review of systems for the eyes was negative other than the pertinent positives/negatives listed in the HPI.      Assessment & Plan    HPI:  Cecil Haddad is a 64 year old male with history of myopia, retinal dystrophy presents for Postoperative week 5 left eye. Missed 5 days right eye drops while inpatient. He has been using FML four times a day , pred forte every hours and ketorolac four times a day and FML qid. Continuing to notice mild improvement left eye       He went to [facility] for retina evaluation and was told that it would be 4-5 additional hours after he had already been waiting 2.5 hours. Willing to go back if needed, but needs am appointment.     He was unable to get durezol due to insurance purposes      POHx: cone-nicholas dystrophy, Cataract extraction/intraocular lens   PMHx:   Current Medications:   Current Outpatient Medications   Medication Sig Dispense Refill    ASPIRIN 81 PO Take 1 tablet by mouth daily.      difluprednate (DUREZOL) 0.05 % ophthalmic emulsion Place 1 drop Into the left eye 4 times daily. 5 mL 3    fluorometholone (FML LIQUIFILM) 0.1 % ophthalmic suspension Place 1 drop Into the left eye 4 times daily. 5 mL 2    ketorolac (ACULAR) 0.5 % ophthalmic solution Place 1 drop Into the left eye 4 times daily. 5 mL 3    magnesium oxide (MAG-OX) 400 MG tablet Take 400 mg by mouth daily.      potassium chloride nancy ER (KLOR-CON M20) 20 MEQ CR tablet Take 40 mEq by mouth.       No current facility-administered medications for this visit.      FHx: denies family history of ocular conditions   PSHx: Cataract extraction/intraocular lens Rayne right eye 10/18/24, left eye 11/01/24     Current Eye Medications:  Ketorolac four times a day   Pred forte every two hours left eye   FML four times a day left eye     Assessment & Plan:  Pseudophakia, left eye  Mclain left eye 11/01/24   Continued rebound inflammation today but improved compared to previous  No retained fragment on gonioscopy again today 12/03/24  Insurance requires FML trial prior to durezol  FML showing modest improvement  Use pred forte every 2 hours while awake and FML four times a day    Continue ketorolac four times a day      May have had ineffective pred acetate bottle or confusion with drop administration  Looking much better today    Still consider possible low grade infection like p acnes    Pseudophakia, right eye  Rayne right eye 10/18/24   Doing well  MRx dispensed previously for right eye     (H43.391) Vitreous floaters of right eye  (primary encounter diagnosis)  No holes or tears 360    (H52.13,  H52.203,  H52.4) Myopia of both eyes with astigmatism and presbyopia  Manifest refraction dispensed today for right eye     (H04.122) Dry eye syndrome of left eye    (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        Johny Mclain MD     Attending Physician Attestation:  Complete documentation of historical and exam elements from today's encounter can be found in the full encounter summary report (not reduplicated in this progress note).  I personally obtained the chief complaint(s) and history of present illness.  I confirmed and edited as necessary the review of systems, past medical/surgical history, family history, social history, and examination findings  as documented by others; and I examined the patient myself.  I personally reviewed the relevant tests, images, and reports as documented above.  I formulated and edited as necessary the assessment and plan and discussed the findings and management plan with the patient and family. - Johny Mclain MD

## 2024-12-31 ENCOUNTER — OFFICE VISIT (OUTPATIENT)
Dept: FAMILY MEDICINE | Facility: CLINIC | Age: 64
End: 2024-12-31
Payer: COMMERCIAL

## 2024-12-31 VITALS
WEIGHT: 146 LBS | TEMPERATURE: 97.5 F | DIASTOLIC BLOOD PRESSURE: 68 MMHG | HEIGHT: 70 IN | BODY MASS INDEX: 20.9 KG/M2 | SYSTOLIC BLOOD PRESSURE: 102 MMHG | OXYGEN SATURATION: 100 % | HEART RATE: 72 BPM | RESPIRATION RATE: 15 BRPM

## 2024-12-31 DIAGNOSIS — R74.8 ELEVATED LIVER ENZYMES: ICD-10-CM

## 2024-12-31 DIAGNOSIS — N17.9 AKI (ACUTE KIDNEY INJURY) (H): ICD-10-CM

## 2024-12-31 DIAGNOSIS — Z85.528 HISTORY OF RENAL CELL CARCINOMA: ICD-10-CM

## 2024-12-31 DIAGNOSIS — R12 HEART BURN: ICD-10-CM

## 2024-12-31 DIAGNOSIS — F17.209 TOBACCO USE DISORDER, CONTINUOUS: ICD-10-CM

## 2024-12-31 DIAGNOSIS — E83.42 HYPOMAGNESEMIA: ICD-10-CM

## 2024-12-31 DIAGNOSIS — E87.6 HYPOKALEMIA: ICD-10-CM

## 2024-12-31 DIAGNOSIS — Z76.89 ENCOUNTER TO ESTABLISH CARE: Primary | ICD-10-CM

## 2024-12-31 DIAGNOSIS — K85.90 RECURRENT ACUTE PANCREATITIS: ICD-10-CM

## 2024-12-31 DIAGNOSIS — J44.9 CHRONIC OBSTRUCTIVE PULMONARY DISEASE, UNSPECIFIED COPD TYPE (H): ICD-10-CM

## 2024-12-31 PROBLEM — H25.813 COMBINED FORMS OF AGE-RELATED CATARACT OF BOTH EYES: Status: RESOLVED | Noted: 2024-08-20 | Resolved: 2024-12-31

## 2024-12-31 LAB
ALBUMIN SERPL BCG-MCNC: 3.8 G/DL (ref 3.5–5.2)
ALP SERPL-CCNC: 89 U/L (ref 40–150)
ALT SERPL W P-5'-P-CCNC: 30 U/L (ref 0–70)
ANION GAP SERPL CALCULATED.3IONS-SCNC: 9 MMOL/L (ref 7–15)
AST SERPL W P-5'-P-CCNC: 23 U/L (ref 0–45)
BILIRUB SERPL-MCNC: 0.2 MG/DL
BUN SERPL-MCNC: 29.9 MG/DL (ref 8–23)
CALCIUM SERPL-MCNC: 8.9 MG/DL (ref 8.8–10.4)
CHLORIDE SERPL-SCNC: 106 MMOL/L (ref 98–107)
CREAT SERPL-MCNC: 1.87 MG/DL (ref 0.67–1.17)
EGFRCR SERPLBLD CKD-EPI 2021: 40 ML/MIN/1.73M2
ERYTHROCYTE [DISTWIDTH] IN BLOOD BY AUTOMATED COUNT: 14.6 % (ref 10–15)
GLUCOSE SERPL-MCNC: 102 MG/DL (ref 70–99)
HCO3 SERPL-SCNC: 25 MMOL/L (ref 22–29)
HCT VFR BLD AUTO: 41.2 % (ref 40–53)
HGB BLD-MCNC: 13.1 G/DL (ref 13.3–17.7)
MAGNESIUM SERPL-MCNC: 2 MG/DL (ref 1.7–2.3)
MCH RBC QN AUTO: 29.7 PG (ref 26.5–33)
MCHC RBC AUTO-ENTMCNC: 31.8 G/DL (ref 31.5–36.5)
MCV RBC AUTO: 93 FL (ref 78–100)
PLATELET # BLD AUTO: 193 10E3/UL (ref 150–450)
POTASSIUM SERPL-SCNC: 4 MMOL/L (ref 3.4–5.3)
PROT SERPL-MCNC: 5.9 G/DL (ref 6.4–8.3)
RBC # BLD AUTO: 4.41 10E6/UL (ref 4.4–5.9)
SODIUM SERPL-SCNC: 140 MMOL/L (ref 135–145)
WBC # BLD AUTO: 11.2 10E3/UL (ref 4–11)

## 2024-12-31 PROCEDURE — 99214 OFFICE O/P EST MOD 30 MIN: CPT | Performed by: INTERNAL MEDICINE

## 2024-12-31 PROCEDURE — 83735 ASSAY OF MAGNESIUM: CPT | Performed by: INTERNAL MEDICINE

## 2024-12-31 PROCEDURE — 80053 COMPREHEN METABOLIC PANEL: CPT | Performed by: INTERNAL MEDICINE

## 2024-12-31 PROCEDURE — 85027 COMPLETE CBC AUTOMATED: CPT | Performed by: INTERNAL MEDICINE

## 2024-12-31 PROCEDURE — 36415 COLL VENOUS BLD VENIPUNCTURE: CPT | Performed by: INTERNAL MEDICINE

## 2024-12-31 RX ORDER — MAGNESIUM OXIDE 400 MG/1
400 TABLET ORAL DAILY
Qty: 60 TABLET | Refills: 5 | Status: SHIPPED | OUTPATIENT
Start: 2024-12-31

## 2024-12-31 RX ORDER — POTASSIUM CHLORIDE 1500 MG/1
40 TABLET, EXTENDED RELEASE ORAL
Status: CANCELLED | OUTPATIENT
Start: 2024-12-31

## 2024-12-31 RX ORDER — POTASSIUM CHLORIDE 1500 MG/1
40 TABLET, EXTENDED RELEASE ORAL 2 TIMES DAILY
Qty: 240 TABLET | Refills: 5 | Status: SHIPPED | OUTPATIENT
Start: 2024-12-31

## 2024-12-31 ASSESSMENT — PAIN SCALES - GENERAL: PAINLEVEL_OUTOF10: NO PAIN (0)

## 2024-12-31 NOTE — PROGRESS NOTES
Assessment & Plan     1.  Encounter to establish care.  2.  Hypokalemia very severe for which hospitalized on 11/11/2024 with a potassium of 1.3.  Etiology unclear.  Workup in the hospital as stated below negative.  Currently on potassium 20 mill colons 2 tablets twice a day.  I will be checking lab.  3.  Hypomagnesemia mild patient discharged on magnesium oxide 400 mg daily.  4.  HILARY during recent hospitalization.  I will be checking lab today.  Has solitary right kidney.  5.  Tobacco use disorder.  48 pack year history of smoking.  6.  History of idiopathic recurrent pancreatitis between year 8325-3164.  7.  History of renal cell carcinoma treated with left nephrectomy in 2004.  8.  COPD  9.  Heartburn off-and-on longstanding omeprazole 20 mg daily as needed prescribed.  10.  Elevated liver enzyme.  Both AST and ALT during recent hospitalization mildly elevated.  I will be rechecking it today.    Patient will have CBC CMP and magnesium level.  I will get back with recommendation.    Nicotine/Tobacco Cessation  He reports that he has been smoking cigarettes. He started smoking about 48 years ago. He has a 48 pack-year smoking history. He has been exposed to tobacco smoke. He uses smokeless tobacco.  Nicotine/Tobacco Cessation Plan  Self help information given to patient      Subjective   Cecil is a 64 year old, presenting for the following health issues:  Establish Care (Treatment for low potassium )      12/31/2024     7:06 AM   Additional Questions   Roomed by Amador   Accompanied by wife         12/31/2024     7:06 AM   Patient Reported Additional Medications   Patient reports taking the following new medications no     History of Present Illness       Reason for visit:  Need a care provider  Symptom onset:  More than a month  Symptoms include:  Low He is missing 1 dose(s) of medications per week.         Concern - low potasium from UC visit in November, would like to establish care and treatment  "    64-year-old gentleman comes in accompanied by his wife to establish care.  He was hospitalized on 11/11/2024 with severe weakness and fall at home.  He fell at least 3 times before he can get up and ended up calling ambulance.  He was found to have a potassium of 1.3 and a mildly low magnesium of 1.4.  Workup in the hospital did not reveal the cause of hypokalemia.  Patient not on any medication that would cause low potassium.  Denied illicit drug use.  No history of potassium losing kidney issue.  No nausea vomiting diarrhea etc.  His blood pressure at high and hyperaldosterone pretty much ruled out.  Patient discharged on potassium and oral magnesium.  Looks like patient does not consistently take the potassium and the amount prescribed.  New    He is a heavy smoker.  Smoking a pack of cigarettes a day.  Does give history of heartburn off-and-on.  Occasional marijuana use.      Review of Systems  Constitutional, HEENT, cardiovascular, pulmonary, GI, , musculoskeletal, neuro, skin, endocrine and psych systems are negative, except as otherwise noted.      Objective    /68 (BP Location: Right arm, Patient Position: Sitting, Cuff Size: Adult Regular)   Pulse 72   Temp 97.5  F (36.4  C) (Temporal)   Resp 15   Ht 1.778 m (5' 10\")   Wt 66.2 kg (146 lb)   SpO2 100%   BMI 20.95 kg/m    Body mass index is 20.95 kg/m .  Physical Exam   GENERAL: alert and no distress  NECK: no adenopathy, no asymmetry, masses, or scars  RESP: Hyperresonant chest with decreased breath sounds bilaterally.  Few end expiratory wheeze bilaterally.  CV: regular rate and rhythm, normal S1 S2, no S3 or S4, no murmur, click or rub, no peripheral edema  ABDOMEN: soft, nontender, no hepatosplenomegaly, no masses and bowel sounds normal  MS: no gross musculoskeletal defects noted, no edema    Lab and x-ray: Reviewed in care everywhere    11/12/2024 :   Potassium was 1.3 sodium 144 chloride 108 bicarb 27 creatinine 2.28 BUN 25.  Calcium " 8.2 anion gap 9.  WBC 12.8 hemoglobin 13.6.  Urine osmolality low at 224.  Plasma some osmolality at 295 which is within normal limits of normal.  Urine potassium 9.0.  Albumin 3.1 magnesium 1.4 TSH 2.049, LFTs that showed slightly elevated ALT of 105 and .  Serum cortisol level 15.9.  PTH elevated 146.  Renin activity less than 0.167 so quite low.  Aldosterone less than 1.0.    Chest x-ray clear  CT head and neck negative except for cervical spondylosis.  Ultrasound kidney.  Right kidney looked normal.  Left kidney surgically absent.          Signed Electronically by: Sanjiv Wade MD

## 2024-12-31 NOTE — LETTER
January 1, 2025      Cecil Haddad  [address] [address] [place] [id number]        Dear ,    We are writing to inform you of your test results.    Your white count is okay at 11.2 and hemoglobin is fine at 13.1 g/dL.  The electrolytes are normal.  The potassium and magnesium levels are good so I like you to continue with present dose of potassium and magnesium.  The kidney test (creatinine and BUN) is elevated and consistent with stage IIIb chronic kidney disease.  The kidney functions has improved since checked in the hospital.  However you only have one kidney so it is important that we keep a close eye on it.  I like you to return to see me in 2 months with repeat lab to check potassium magnesium and kidney function.      Resulted Orders   CBC with platelets   Result Value Ref Range    WBC Count 11.2 (H) 4.0 - 11.0 10e3/uL    RBC Count 4.41 4.40 - 5.90 10e6/uL    Hemoglobin 13.1 (L) 13.3 - 17.7 g/dL    Hematocrit 41.2 40.0 - 53.0 %    MCV 93 78 - 100 fL    MCH 29.7 26.5 - 33.0 pg    MCHC 31.8 31.5 - 36.5 g/dL    RDW 14.6 10.0 - 15.0 %    Platelet Count 193 150 - 450 10e3/uL   Comprehensive metabolic panel   Result Value Ref Range    Sodium 140 135 - 145 mmol/L    Potassium 4.0 3.4 - 5.3 mmol/L    Carbon Dioxide (CO2) 25 22 - 29 mmol/L    Anion Gap 9 7 - 15 mmol/L    Urea Nitrogen 29.9 (H) 8.0 - 23.0 mg/dL    Creatinine 1.87 (H) 0.67 - 1.17 mg/dL    GFR Estimate 40 (L) >60 mL/min/1.73m2      Comment:      eGFR calculated using 2021 CKD-EPI equation.    Calcium 8.9 8.8 - 10.4 mg/dL      Comment:      Reference intervals for this test were updated on 7/16/2024 to reflect our healthy population more accurately. There may be differences in the flagging of prior results with similar values performed with this method. Those prior results can be interpreted in the context of the updated reference intervals.    Chloride 106 98 - 107 mmol/L    Glucose 102 (H) 70 - 99 mg/dL    Alkaline Phosphatase 89 40 - 150  U/L    AST 23 0 - 45 U/L    ALT 30 0 - 70 U/L    Protein Total 5.9 (L) 6.4 - 8.3 g/dL    Albumin 3.8 3.5 - 5.2 g/dL    Bilirubin Total 0.2 <=1.2 mg/dL   Magnesium   Result Value Ref Range    Magnesium 2.0 1.7 - 2.3 mg/dL       If you have any questions or concerns, please call the clinic at the number listed above.       Sincerely,      Sanjiv Wade MD

## 2025-01-01 PROBLEM — N18.32 STAGE 3B CHRONIC KIDNEY DISEASE (CKD) (H): Status: ACTIVE | Noted: 2025-01-01

## 2025-01-07 ENCOUNTER — OFFICE VISIT (OUTPATIENT)
Dept: OPHTHALMOLOGY | Facility: CLINIC | Age: 65
End: 2025-01-07
Payer: COMMERCIAL

## 2025-01-07 DIAGNOSIS — H35.50 RETINAL DYSTROPHY: ICD-10-CM

## 2025-01-07 DIAGNOSIS — H20.9 UVEITIS OF LEFT EYE: Primary | ICD-10-CM

## 2025-01-07 DIAGNOSIS — H52.4 MYOPIA OF BOTH EYES WITH ASTIGMATISM AND PRESBYOPIA: ICD-10-CM

## 2025-01-07 DIAGNOSIS — H52.13 MYOPIA OF BOTH EYES WITH ASTIGMATISM AND PRESBYOPIA: ICD-10-CM

## 2025-01-07 DIAGNOSIS — Z96.1 PSEUDOPHAKIA, BOTH EYES: ICD-10-CM

## 2025-01-07 DIAGNOSIS — H52.203 MYOPIA OF BOTH EYES WITH ASTIGMATISM AND PRESBYOPIA: ICD-10-CM

## 2025-01-07 PROCEDURE — 99024 POSTOP FOLLOW-UP VISIT: CPT | Performed by: OPHTHALMOLOGY

## 2025-01-07 ASSESSMENT — REFRACTION_MANIFEST
OD_AXIS: 017
OD_SPHERE: -0.75
OS_SPHERE: -1.75
OS_AXIS: 026
OS_CYLINDER: +0.50
OD_CYLINDER: +0.75

## 2025-01-07 ASSESSMENT — VISUAL ACUITY
OS_PH_SC: 20/50
OD_SC: 20/40
METHOD: SNELLEN - LINEAR
OS_SC: 20/80
OS_PH_SC+: -2

## 2025-01-07 ASSESSMENT — EXTERNAL EXAM - LEFT EYE: OS_EXAM: NORMAL

## 2025-01-07 ASSESSMENT — TONOMETRY
OS_IOP_MMHG: 15
OD_IOP_MMHG: 14
IOP_METHOD: TONOPEN

## 2025-01-07 ASSESSMENT — SLIT LAMP EXAM - LIDS
COMMENTS: NORMAL
COMMENTS: UPPER LID TRICHIASIS

## 2025-01-07 ASSESSMENT — EXTERNAL EXAM - RIGHT EYE: OD_EXAM: NORMAL

## 2025-01-07 NOTE — PROGRESS NOTES
HPI       Post Op (Ophthalmology) Left Eye    In left eye.             Comments    Patient here for 2 week follow up of rebound inflammation (cataract surgery 11/1/24). Pt feels that things have improved a little bit since last visit. Denies ocular pain. Using PF every 2 hours while awake, FML four times daily and ketrolac four times daily-all drops in left eye only. No right eye drops.           Last edited by Callie Moore COT on 1/7/2025  1:45 PM.           Review of systems for the eyes was negative other than the pertinent positives/negatives listed in the HPI.      Assessment & Plan    HPI:  Cecil Haddad is a 64 year old male with history of myopia, retinal dystrophy presents for Postoperative week 5 left eye. Missed 5 days right eye drops while inpatient. He has been using FML four times a day , pred forte every hours and ketorolac four times a day and FML qid. Continuing to notice mild improvement left eye       He was unable to get durezol due to insurance purposes      POHx: cone-nicholas dystrophy, Cataract extraction/intraocular lens   PMHx:   Current Medications:   Current Outpatient Medications   Medication Sig Dispense Refill    ASPIRIN 81 PO Take 1 tablet by mouth daily.      fluorometholone (FML LIQUIFILM) 0.1 % ophthalmic suspension Place 1 drop Into the left eye 4 times daily. 5 mL 2    ketorolac (ACULAR) 0.5 % ophthalmic solution Place 1 drop Into the left eye 4 times daily. 5 mL 3    magnesium oxide (MAG-OX) 400 MG tablet Take 1 tablet (400 mg) by mouth daily. 60 tablet 5    omeprazole (PRILOSEC) 20 MG DR capsule Take 1 capsule (20 mg) by mouth daily. 90 capsule 1    potassium chloride nancy ER (KLOR-CON M20) 20 MEQ CR tablet Take 2 tablets (40 mEq) by mouth 2 times daily. 240 tablet 5     No current facility-administered medications for this visit.     FHx: denies family history of ocular conditions   PSHx: Cataract extraction/intraocular lens Mclain right eye 10/18/24, left eye 11/01/24      Current Eye Medications:  Ketorolac four times a day   Pred forte every two hours left eye   FML four times a day left eye     Assessment & Plan:  Pseudophakia, left eye  Mclain left eye 11/01/24   Nearly resolved rebound inflammation today on ketorolac, pred forte and FML qid    No retained fragment on gonioscopy 12/03/24  FML showing significant improvement    May have had ineffective pred acetate bottle or confusion with drop administration  Looking great today but best corrected visual acuity still 20/25 left eye   To see Linda 1/16/25    Taper drops, three times a day x 1 week, twice a day x 1 week, daily x 1 week, then stop    Pseudophakia, right eye  Mclain right eye 10/18/24   Doing well  MRx dispensed previously for right eye     (H43.391) Vitreous floaters of right eye  (primary encounter diagnosis)  No holes or tears 360    (H52.13,  H52.203,  H52.4) Myopia of both eyes with astigmatism and presbyopia  Manifest refraction dispensed today for left eye     (H04.122) Dry eye syndrome of left eye    (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        Johny Mclain MD     Attending Physician Attestation:  Complete documentation of historical and exam elements from today's encounter can be found in the full encounter summary report (not reduplicated in this progress note).  I personally obtained the chief complaint(s) and history of present illness.  I confirmed and edited as necessary the review of systems, past medical/surgical history, family history, social history, and examination findings as documented by others; and I examined the patient myself.  I personally reviewed the relevant tests, images, and reports as documented above.  I formulated and edited as necessary the  assessment and plan and discussed the findings and management plan with the patient and family. - Johny Mclain MD

## 2025-01-07 NOTE — NURSING NOTE
Chief Complaints and History of Present Illnesses   Patient presents with    Post Op (Ophthalmology) Left Eye       Chief Complaint(s) and History of Present Illness(es)       Post Op (Ophthalmology) Left Eye              Laterality: left eye              Comments    Patient here for 2 week follow up of rebound inflammation (cataract surgery 11/1/24). Pt feels that things have improved a little bit since last visit. Denies ocular pain. Using PF every 2 hours while awake, FML four times daily and ketrolac four times daily-all drops in left eye only. No right eye drops.                    Callie Moore, COT

## 2025-01-08 DIAGNOSIS — H35.50 RETINAL DYSTROPHY: Primary | ICD-10-CM

## 2025-01-16 ENCOUNTER — TELEPHONE (OUTPATIENT)
Dept: BEHAVIORAL HEALTH | Facility: CLINIC | Age: 65
End: 2025-01-16
Payer: COMMERCIAL

## 2025-01-16 ENCOUNTER — OFFICE VISIT (OUTPATIENT)
Dept: OPHTHALMOLOGY | Facility: CLINIC | Age: 65
End: 2025-01-16
Attending: OPHTHALMOLOGY
Payer: COMMERCIAL

## 2025-01-16 DIAGNOSIS — H35.50 RETINAL DYSTROPHY: ICD-10-CM

## 2025-01-16 PROCEDURE — 99207 FUNDUS AUTOFLUORESCENCE IMAGE (FAF) OU (BOTH EYES): CPT | Mod: 26 | Performed by: OPHTHALMOLOGY

## 2025-01-16 PROCEDURE — G0463 HOSPITAL OUTPT CLINIC VISIT: HCPCS | Performed by: OPHTHALMOLOGY

## 2025-01-16 PROCEDURE — 92250 FUNDUS PHOTOGRAPHY W/I&R: CPT | Performed by: OPHTHALMOLOGY

## 2025-01-16 PROCEDURE — G2211 COMPLEX E/M VISIT ADD ON: HCPCS | Mod: GC | Performed by: OPHTHALMOLOGY

## 2025-01-16 PROCEDURE — 99214 OFFICE O/P EST MOD 30 MIN: CPT | Mod: GC | Performed by: OPHTHALMOLOGY

## 2025-01-16 PROCEDURE — 92134 CPTRZ OPH DX IMG PST SGM RTA: CPT | Performed by: OPHTHALMOLOGY

## 2025-01-16 ASSESSMENT — CONF VISUAL FIELD
OD_SUPERIOR_NASAL_RESTRICTION: 0
OD_SUPERIOR_TEMPORAL_RESTRICTION: 0
OD_INFERIOR_TEMPORAL_RESTRICTION: 0
OS_INFERIOR_NASAL_RESTRICTION: 0
OS_INFERIOR_TEMPORAL_RESTRICTION: 0
OS_SUPERIOR_TEMPORAL_RESTRICTION: 0
OD_INFERIOR_NASAL_RESTRICTION: 0
OS_NORMAL: 1
OD_NORMAL: 1
OS_SUPERIOR_NASAL_RESTRICTION: 0

## 2025-01-16 ASSESSMENT — SLIT LAMP EXAM - LIDS
COMMENTS: UPPER LID TRICHIASIS
COMMENTS: NORMAL

## 2025-01-16 ASSESSMENT — EXTERNAL EXAM - LEFT EYE: OS_EXAM: NORMAL

## 2025-01-16 ASSESSMENT — VISUAL ACUITY
OS_SC: 20/150
METHOD: SNELLEN - LINEAR
OD_SC: 20/40

## 2025-01-16 ASSESSMENT — PATIENT HEALTH QUESTIONNAIRE - PHQ9: SUM OF ALL RESPONSES TO PHQ QUESTIONS 1-9: 23

## 2025-01-16 ASSESSMENT — TONOMETRY
OS_IOP_MMHG: 15
IOP_METHOD: TONOPEN
OD_IOP_MMHG: 5,6

## 2025-01-16 ASSESSMENT — CUP TO DISC RATIO
OS_RATIO: 0.4
OD_RATIO: 0.4

## 2025-01-16 ASSESSMENT — EXTERNAL EXAM - RIGHT EYE: OD_EXAM: NORMAL

## 2025-01-16 NOTE — LETTER
1/16/2025       RE: Cecil Haddad  [address] [address] [facility] [address]     Dear Colleague,    Thank you for referring your patient, Cecil Haddad, to the [facility] [place] at [facility]. Please see a copy of my visit note below.     CC: decreased vision both eyes   INTERVAL HISTORY -  Patient's last visit with Dr. Gallego was 9/13/2021. Patient reports progressive vision loss that started about 5 years ago. Patient is very bothered by night vision.   Patient is now s/p cataract surgery OU 10/2024 with Dr. Mclain. Patient says the vision has been worse since cataract surgery.   Patient also expressing significant distress over housing insecurity and financial concerns that he attributes to low vision. 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     [facility] - Cecil Haddad is a  64 year old year-old patient originally referred by Dr Millan for evaluation and treat of retinal macular atrophy.  Patient reports that starting in 2017 he reports that things are grey and cloudy during the day. Difficulty seeing at night, with everything being dark. Also has trouble seeing in normal lighting condition and his home is very brightly lit.   He reports that he first noticed this about 3 years ago when he started getting a red tinged visual field. He reports that is has progressed since then and is getting worse at this time. He does report that it depends on the day in terms of his symptoms. He reports kerry days are especially bad with his visual field being washed out.   Has had occasional flashes of lights in his eyes - usually around peripheral of eyes 1-2 x/month - since 9/2020. Has floaters life long - not increased.   No other symptoms. No headaches. He does report hearing difficulties (ongoing for a long while - working around loud machinery / white noise / static).     Smokes 1 PPD and uses  marijuana. Last had alcohol about 12 years ago  Patient takes vitamin supplements - Multipurpose vitamins 2-3x/week.   History of kidney cancer - diagnosed in 2004 s/p resection. Last followed 16-17 years ago. No recent hx of bloody urine.    History of pancreatitis     No family history of vision loss. No family history known but limited family members. (Has a sister - doesn't know about Hx; Did not know Father. Mother worse glasses although no hx of glasses usage).     RETINAL IMAGING:    OCT 01/16/25:  OD - PVD, loss of photoreceptor in the perifoveal region, thin choroid  OS - PVD, loss of photoreceptor slightly more extensive than the right, thin choroid     AF 01/16/25:  OD - hyper-FAF ring surrounding fovea. Loss of hypo-FAF of fovea. Patches of hypo-FAF, peripapillary hyper-FAF  OS - hyper-FAF ring surrounding fovea. Loss of hypo-FAF of fovea. Patches of hypo-FAF, peripapillary hyper-FAF    OCT RNFL 4/13/21: OU - normal    ffERG  7-5-21: both eyes - mod/severely attenuated nicholas/cone response with mild increased implicit times    fluorescein angiography: 09/13/21   Normal AV and choroidal filling. Mild peripheral vascular leakage and vascular attenuation. Non specific. No clear vasculitis and possible window's defects. 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 H/o renal cell carcinoma; status post kidney removal 2004  - patient needs repeat ffERG and GVF to monitor for progression of dystrophy   - will consider genetic testing and anti-recoverin,FTA ABS, TB quantiferon and ANAS    #  Pseudophakia OU s/p cataract surgery RE 10/18/24 and LE 11/1/24  - observe     # Depression with suicidal ideation   # Housing instability   -  Patient expressed significant distress regarding current social circumstances and ability to pay rent. Also expressed significant distress about his vision and his inability to improve his vision, which is preventing him from securing a job    - Patient with high PHQ score today (23)   - Patient with no active SI today. Patient wishes to go home to support his partner who recently suffered an ankle injury. Patient also does not have concrete plans for suicide.    - Patient with previous recent referrals to social work and mental health services but patient did not proceed; patient interested in another referral   - [facility]. [facility] offered a same day appointment today but patient declined due to needing to go  his partner from the hospital. However, patient agreeable to meeting with the [facility] early next week    - Scheduled an appointment with a therapist for 1/20 at 10:30 AM    - Ordered free glasses for the patient through [employer]; explained to patient that the glasses will arrive in 2-3 weeks.     # Low vision   - Will place a referral     RTC: 1-2 weeks with ERG, GVF, color vision  to assess progression of the disease    Tina Lamar MD  PGY-3  Department of Ophthalmology  January 16, 2025 11:01 AM     ~~~~~~~~~~~~~~~~~~~~~~~~~~~~~~~~~~   Complete documentation of historical and exam elements from today's encounter can be found in the full encounter summary report (not reduplicated in this progress note).  I personally obtained the chief complaint(s) and history of present illness.  I confirmed and edited as necessary the review of systems, past medical/surgical history, family history, social history, and examination findings as documented by others; and I examined the patient myself.  I personally reviewed the relevant tests, images, and reports as documented above.  I personally reviewed the ophthalmic test(s) associated with  this encounter, agree with the interpretation(s) as documented by the resident/fellow, and have edited the corresponding report(s) as necessary.   I formulated and edited as necessary the assessment and plan and discussed the findings and management plan with the patient and family.  Kim Gallego MD     The longitudinal plan of care for the diagnosis(es)/condition(s) as documented were addressed during this visit. Due to the added complexity in care, I will continue to support Cecil Haddad in the subsequent management and with the ongoing continuity of care.       45 minutes spent by me on the date of the encounter doing chart review, history and exam, documentation and further activities per the note  Low vision rehabilitation at next available     Again, thank you for allowing me to participate in the care of your patient.      Sincerely,    Kim Gallego M.D.  Professor of Ophthalmology  Vitreoretinal Surgeon  [employer] Endowed Chair  Department of Ophthalmology & Visual Neurosciences  [facility]  Phone:  [phone]   Fax:  [phone]

## 2025-01-16 NOTE — NURSING NOTE
"Chief Complaints and History of Present Illnesses   Patient presents with    Blurred Vision Evaluation     Blurry vision following cataract surgery. Patient shares it \"looks like an iris ring with spots.\" History of inconsistent floaters that comes and goes, ongoing for a couple years. Vision is cloudy and distorted, and light sensitive.      Chief Complaint(s) and History of Present Illness(es)       Blurred Vision Evaluation              Laterality: both eyes    Onset: months ago    Associated symptoms: haloes, flashes, floaters and discharge    Comments: Blurry vision following cataract surgery. Patient shares it \"looks like an iris ring with spots.\" History of inconsistent floaters that comes and goes, ongoing for a couple years. Vision is cloudy and distorted, and light sensitive.               Comments    Currently compliant with post op eye drops.                    "

## 2025-01-16 NOTE — PROGRESS NOTES
CC: decreased vision both eyes   INTERVAL HISTORY -  Patient's last visit with Dr. Gallego was 9/13/2021. Patient reports progressive vision loss that started about 5 years ago. Patient is very bothered by night vision.   Patient is now s/p cataract surgery OU 10/2024 with Dr. Mclain. Patient says the vision has been worse since cataract surgery.   Patient also expressing significant distress over housing insecurity and financial concerns that he attributes to low vision. 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     [facility] - Cecil Haddad is a  64 year old year-old patient originally referred by Dr Millan for evaluation and treat of retinal macular atrophy.  Patient reports that starting in 2017 he reports that things are grey and cloudy during the day. Difficulty seeing at night, with everything being dark. Also has trouble seeing in normal lighting condition and his home is very brightly lit.   He reports that he first noticed this about 3 years ago when he started getting a red tinged visual field. He reports that is has progressed since then and is getting worse at this time. He does report that it depends on the day in terms of his symptoms. He reports kerry days are especially bad with his visual field being washed out.   Has had occasional flashes of lights in his eyes - usually around peripheral of eyes 1-2 x/month - since 9/2020. Has floaters life long - not increased.   No other symptoms. No headaches. He does report hearing difficulties (ongoing for a long while - working around loud machinery / white noise / static).     Smokes 1 PPD and uses marijuana. Last had alcohol about 12 years ago  Patient takes vitamin supplements - Multipurpose vitamins 2-3x/week.   History of kidney cancer - diagnosed in 2004 s/p resection. Last followed 16-17 years ago. No recent hx of bloody urine.    History of pancreatitis     No family history of vision loss. No family  history known but limited family members. (Has a sister - doesn't know about Hx; Did not know Father. Mother worse glasses although no hx of glasses usage).     RETINAL IMAGING:    OCT 01/16/25:  OD - PVD, loss of photoreceptor in the perifoveal region, thin choroid  OS - PVD, loss of photoreceptor slightly more extensive than the right, thin choroid     AF 01/16/25:  OD - hyper-FAF ring surrounding fovea. Loss of hypo-FAF of fovea. Patches of hypo-FAF, peripapillary hyper-FAF  OS - hyper-FAF ring surrounding fovea. Loss of hypo-FAF of fovea. Patches of hypo-FAF, peripapillary hyper-FAF    OCT RNFL 4/13/21: OU - normal    ffERG  7-5-21: both eyes - mod/severely attenuated nicholas/cone response with mild increased implicit times    fluorescein angiography: 09/13/21   Normal AV and choroidal filling. Mild peripheral vascular leakage and vascular attenuation. Non specific. No clear vasculitis and possible window's defects. 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 H/o renal cell carcinoma; status post kidney removal 2004  - patient needs repeat ffERG and GVF to monitor for progression of dystrophy   - will consider genetic testing and anti-recoverin,FTA ABS, TB quantiferon and ANAS    #  Pseudophakia OU s/p cataract surgery RE 10/18/24 and LE 11/1/24  - observe     # Depression with suicidal ideation   # Housing instability   - Patient expressed significant distress regarding current social circumstances and ability to pay rent. Also expressed significant distress about his vision and his inability to improve his vision, which is preventing him from securing a job    - Patient with high PHQ score today (23)   - Patient with no active SI  today. Patient wishes to go home to support his partner who recently suffered an ankle injury. Patient also does not have concrete plans for suicide.    - Patient with previous recent referrals to social work and mental health services but patient did not proceed; patient interested in another referral   - [facility] for [facility] health support. [facility] offered a same day appointment today but patient declined due to needing to go  his partner from the hospital. However, patient agreeable to meeting with the [facility] early next week    - Scheduled an appointment with a therapist for 1/20 at 10:30 AM    - Ordered free glasses for the patient through [employer]; explained to patient that the glasses will arrive in 2-3 weeks.     # Low vision   - Will place a referral     RTC: 1-2 weeks with ERG, GVF, color vision  to assess progression of the disease      Tina Lamar MD  PGY-3  Department of Ophthalmology  January 16, 2025 11:01 AM     ~~~~~~~~~~~~~~~~~~~~~~~~~~~~~~~~~~   Complete documentation of historical and exam elements from today's encounter can be found in the full encounter summary report (not reduplicated in this progress note).  I personally obtained the chief complaint(s) and history of present illness.  I confirmed and edited as necessary the review of systems, past medical/surgical history, family history, social history, and examination findings as documented by others; and I examined the patient myself.  I personally reviewed the relevant tests, images, and reports as documented above.  I personally reviewed the ophthalmic test(s) associated with this encounter, agree with the interpretation(s) as documented by the resident/fellow, and have edited the corresponding report(s) as necessary.   I formulated and edited as necessary the assessment and plan and discussed the findings and management plan with the patient and family    Kim Gallego,  MD   of Ophthalmology.  Retina Service   Department of Ophthalmology and Visual Neurosciences   [facility]  Phone: [phone]   Fax: [phone]    The longitudinal plan of care for the diagnosis(es)/condition(s) as documented were addressed during this visit. Due to the added complexity in care, I will continue to support Cecil Haddad in the subsequent management and with the ongoing continuity of care.       45 minutes spent by me on the date of the encounter doing chart review, history and exam, documentation and further activities per the note  Low vision rehabilitation at next available

## 2025-01-16 NOTE — NURSING NOTE
"Depression Response    Patient completed the PHQ-9 assessment for depression and scored >9? Yes  Question 9 on the PHQ-9 was positive for suicidality? Yes  Does patient have current mental health provider? No - patient states he \"doesn't really need one unless they'll give me the pills to take care of it\"    Is this a virtual visit? No    I personally notified the following: visit provider         Dr. Lamar (ophthalmology resident) spoke with [facility] today, and a virtual appt was offered to pt for today at 11:30 AM, but he declined this visit  Social work referrals have been placed for pt in the past, but SW is then not able to get in touch with patient in the end  "

## 2025-01-16 NOTE — TELEPHONE ENCOUNTER
TC received call from Tina Lamar  resident at [facility] at [email or web address] Said patient is in clinic right now and is screening high for PHQ 9 questoinaire, pt has SI but more passive, no active plans to kill self today or in near future. Past attempts. Pt never followed though with referrals in past. Patient refusing to take appt today with TC. Said patient will take appt next week.     Coordinated with resident and patient to schedule TC Therapy appt via video Monday 1/20/2025.     Chloe Grier  [facility] Coordinator  01/16/25 11:35 AM

## 2025-01-20 ENCOUNTER — TELEPHONE (OUTPATIENT)
Dept: BEHAVIORAL HEALTH | Facility: CLINIC | Age: 65
End: 2025-01-20
Payer: COMMERCIAL

## 2025-01-20 ENCOUNTER — VIRTUAL VISIT (OUTPATIENT)
Dept: BEHAVIORAL HEALTH | Facility: CLINIC | Age: 65
End: 2025-01-20
Payer: COMMERCIAL

## 2025-01-20 DIAGNOSIS — F32.1 MAJOR DEPRESSIVE DISORDER, SINGLE EPISODE, MODERATE (H): Primary | ICD-10-CM

## 2025-01-20 DIAGNOSIS — F41.1 GENERALIZED ANXIETY DISORDER: ICD-10-CM

## 2025-01-20 PROCEDURE — 99207 PR NO CHARGE LOS: CPT | Mod: 95 | Performed by: SOCIAL WORKER

## 2025-01-20 ASSESSMENT — ANXIETY QUESTIONNAIRES
5. BEING SO RESTLESS THAT IT IS HARD TO SIT STILL: MORE THAN HALF THE DAYS
2. NOT BEING ABLE TO STOP OR CONTROL WORRYING: MORE THAN HALF THE DAYS
1. FEELING NERVOUS, ANXIOUS, OR ON EDGE: MORE THAN HALF THE DAYS
4. TROUBLE RELAXING: NEARLY EVERY DAY
7. FEELING AFRAID AS IF SOMETHING AWFUL MIGHT HAPPEN: NEARLY EVERY DAY
GAD7 TOTAL SCORE: 18
3. WORRYING TOO MUCH ABOUT DIFFERENT THINGS: NEARLY EVERY DAY
6. BECOMING EASILY ANNOYED OR IRRITABLE: NEARLY EVERY DAY

## 2025-01-20 ASSESSMENT — COLUMBIA-SUICIDE SEVERITY RATING SCALE - C-SSRS
3. HAVE YOU BEEN THINKING ABOUT HOW YOU MIGHT KILL YOURSELF?: YES
4. HAVE YOU HAD THESE THOUGHTS AND HAD SOME INTENTION OF ACTING ON THEM?: NO
1. IN THE PAST MONTH, HAVE YOU WISHED YOU WERE DEAD OR WISHED YOU COULD GO TO SLEEP AND NOT WAKE UP?: YES
2. HAVE YOU ACTUALLY HAD ANY THOUGHTS OF KILLING YOURSELF IN THE PAST MONTH?: YES
6. HAVE YOU EVER DONE ANYTHING, STARTED TO DO ANYTHING, OR PREPARED TO DO ANYTHING TO END YOUR LIFE?: NO
5. HAVE YOU STARTED TO WORK OUT OR WORKED OUT THE DETAILS OF HOW TO KILL YOURSELF? DO YOU INTEND TO CARRY OUT THIS PLAN?: NO

## 2025-01-20 ASSESSMENT — PATIENT HEALTH QUESTIONNAIRE - PHQ9: SUM OF ALL RESPONSES TO PHQ QUESTIONS 1-9: 23

## 2025-01-20 NOTE — TELEPHONE ENCOUNTER
[facility] Next Level of Care Referral Request    MRN: [id number]  Patient Name:  Cecil Haddad  Phone:  [phone] (home)   E-mail Address: [email or web address]    Type of patient appointment needed: Individual therapy    Provider Specialties: Specialty Services Requested: Depression    Service Modality:  Service Modality: Virtual    Clinician Gender Preference: both    Clinical Comments: Long-term Therapy.     JERONIMO WileySW

## 2025-01-20 NOTE — CONFIDENTIAL NOTE
[facility] - Initial Visit Progress Note    Patient  Name: Cecil Haddad, : 1960    Date:  2025       Service Type:  Mental Health Initial Visit       VISIT TIME START:   VISIT TIME END:      Session Length:   TC Session Length: 45 (38-52 Minutes)    Visit #: 1    Attendees:  TC Attendees: Client alone    Service Modality:  Service Modality: Video Visit:      Provider verified identity through the following two step process.  Patient provided:  Patient     Telemedicine Visit: The patient's condition can be safely assessed and treated via synchronous audio and visual telemedicine encounter.      Reason for Telemedicine Visit: Patient has requested telehealth visit    Originating Site (Patient Location): Patient's home    Distant Site (Provider Location): Provider Remote Setting- Home Office    Consent:  The patient/guardian has verbally consented to: the potential risks and benefits of telemedicine (video visit) versus in person care; bill my insurance or make self-payment for services provided; and responsibility for payment of non-covered services.     Patient would like the video invitation sent by:  Send to e-mail at: [email or web address]    Mode of Communication:  Video Conference via [employer]    Distant Location (Provider):  Off-site    As the provider I attest to compliance with applicable laws and regulations related to telemedicine.    Source of Referral:  [facility] ([place])       Informed Consent and Assessment Methods    This provider and patient discussed HIPAA, and the limits of confidentiality; including mandated reporting, the possibility of collaborative discussions with patient's primary care provider and the multidisciplinary team in the MH clinic during consultation.  Discussed the no show policy, and the benefits and possible unintended consequences of therapy.  Patient indicated understanding [facility] services are short term, solution focused, until  "a referral can be made and patient can bridge to long term therapy.  Patient verbally indicated understanding the informed consent.         Presenting Concerns/  Current Stressors:  Cecil Haddad is a 64 year old identified male who was referred to the [facility] by [facility] for Bridging Therapy.  Cecil Haddad reports   That he has been increasingly feeling depressed since his vision has worsened.    Patient reports having difficulty paying his bills  such as, water and phone bill and rent.   Receives food stamps. Patient considering applying for Social Security but would need assistance with that process due to vision issues.  Patient has limited support system other than significant other who resides with him.   Patient commented that he does not really want to die he just wants \"things to be different.\"  Fearful of  ending up on life support  which has kept him from making attempts.  Safety plan  established. Patient  agreeable  bridging therapy  until long-term Therapist is established as well as Social Work referral.  ASSESSMENT:    Required Screenings: The following assessments were completed by patient for this visit:  PHQ9:       8/20/2024     9:11 AM 1/16/2025     9:40 AM 1/20/2025    10:00 AM 1/20/2025    10:44 AM   PHQ-9 SCORE   PHQ-9 Total Score MyChart    Incomplete   PHQ-9 Total Score 12 23 23      GAD7:  Patient scoring 18 on 1/20/2025.   flowsheet not populate.    CAGE-AID:        No data to display              PROMIS 10-Global Health (all questions and answers displayed):       1/20/2025    10:43 AM   PROMIS 10   In general, would you say your health is: Poor   In general, would you say your quality of life is: Poor   In general, how would you rate your physical health? Poor   In general, how would you rate your mental health, including your mood and your ability to think? Fair   In general, how would you rate your satisfaction with your social activities and " relationships? Fair   In general, please rate how well you carry out your usual social activities and roles Poor   To what extent are you able to carry out your everyday physical activities such as walking, climbing stairs, carrying groceries, or moving a chair? A little   In the past 7 days, how often have you been bothered by emotional problems such as feeling anxious, depressed, or irritable? Always   In the past 7 days, how would you rate your fatigue on average? Very severe   In the past 7 days, how would you rate your pain on average, where 0 means no pain, and 10 means worst imaginable pain? 0   In general, would you say your health is: 1    In general, would you say your quality of life is: 1    In general, how would you rate your physical health? 1    In general, how would you rate your mental health, including your mood and your ability to think? 2    In general, how would you rate your satisfaction with your social activities and relationships? 2    In general, please rate how well you carry out your usual social activities and roles. (This includes activities at home, at work and in your community, and responsibilities as a parent, child, spouse, employee, friend, etc.) 1    To what extent are you able to carry out your everyday physical activities such as walking, climbing stairs, carrying groceries, or moving a chair? 2    In the past 7 days, how often have you been bothered by emotional problems such as feeling anxious, depressed, or irritable? 5    In the past 7 days, how would you rate your fatigue on average? 5    In the past 7 days, how would you rate your pain on average, where 0 means no pain, and 10 means worst imaginable pain? 0    Global Mental Health Score 6    Global Physical Health Score 9    PROMIS TOTAL - SUBSCORES 15        Proxy-reported     [place] Suicide Severity Rating Scale (Lifetime/Recent)      11/1/2024    10:54 AM 1/20/2025    10:00 AM   [place] Suicide Severity Rating  (Lifetime/Recent)   Q1 Wished to be Dead (Past Month) 0-->no 1-->yes   Q2 Suicidal Thoughts (Past Month) 0-->no 1-->yes   Q3 Suicidal Thought Method  1-->yes   Q4 Suicidal Intent without Specific Plan  0-->no   Q5 Suicide Intent with Specific Plan  0-->no   Q6 Suicide Behavior (Lifetime) 0-->no 0-->no   Level of Risk per Screen no risks indicated moderate risk       Mental Status Assessment:  Appearance:   Appropriate   Eye Contact:   Good   Psychomotor Behavior: Normal   Attitude:   Cooperative   Orientation:   All  Speech     Rate / Production:  Normal/ Responsive     Volume:   Normal   Mood:    Sad   Affect:    Appropriate   Thought Content:  Clear   Thought Form:  Coherent   Insight:    Good       Safety Issues and Plan for Safety and Risk Management:     Patient denies current fears or concerns for personal safety.  Patient reports the following current or recent suicidal ideation or behaviors:  passive thoughts no specific intentions or plans.  Patient denies current or recent homicidal ideation or behaviors.  Patient denies current or recent self injurious behavior or ideation.  Patient denies other safety concerns.  Recommended that patient call 911 or go to the local ED should there be a change in any of these risk factors  Patient reports there are no firearms in the house.     Diagnostic Criteria:  Generalized Anxiety Disorder   - Being easily fatigued.    - Difficulty concentrating or mind going blank.    - Irritability.    - Sleep disturbance (difficulty falling or staying asleep, or restless unsatisfying sleep).   E. The anxiety, worry, or physical symptoms cause clinically significant distress or impairment in social, occupational, or other important areas of functioning.   Major Depressive Disorder  A) Single episode - symptoms have been present during the same 2-week period and represent a change from previous functioning 5 or more symptoms (required for diagnosis)   - Significant weight  gaindecrease in appetite.    - Increased sleep.    - Fatigue or loss of energy.    - Feelings of worthlessness or excessive guilt.   B) The symptoms cause clinically significant distress or impairment in social, occupational, or other important areas of functioning    DSM5 Diagnoses: (Sustained by DSM5 Criteria Listed Above)  Diagnoses: 296.22 (F32.1)  Major Depressive Disorder, Single Episode, Moderate _ and With anxious distress   generalized anxiety disorder (F41.1)  Psychosocial & Contextual Factors:  increase medical issues as well as, vision impairments.      Intervention:   Solution Focused Brief Therapy:    Cognitive Behavioral Therapy (CBT) - Discussed changing thoughts is the path to changing behaviors and feelings. and Solution-Focused Brief Therapy (SFBT) - Change is perpetual, focus on the problematic excerptions. Reality is subjective and social constructed through conversation.    Current Outpatient Medications   Medication Sig Dispense Refill    ASPIRIN 81 PO Take 1 tablet by mouth daily.      fluorometholone (FML LIQUIFILM) 0.1 % ophthalmic suspension Place 1 drop Into the left eye 4 times daily. 5 mL 2    ketorolac (ACULAR) 0.5 % ophthalmic solution Place 1 drop Into the left eye 4 times daily. 5 mL 3    magnesium oxide (MAG-OX) 400 MG tablet Take 1 tablet (400 mg) by mouth daily. 60 tablet 5    omeprazole (PRILOSEC) 20 MG DR capsule Take 1 capsule (20 mg) by mouth daily. 90 capsule 1    potassium chloride nancy ER (KLOR-CON M20) 20 MEQ CR tablet Take 2 tablets (40 mEq) by mouth 2 times daily. 240 tablet 5     No current facility-administered medications for this visit.       Collateral Reports Completed:  TC Collateral: [facility] electronic medical records reviewed.    DATA:  Interactive Complexity: No  Crisis: No    PLAN: (Homework, other):  Provider will continue Diagnostic Assessment. Initial Treatment will focus on: Depressed Mood -    Anxiety -   .  2.   Provider recommended the  following referrals:   Social work referral  with Amber Oneal  [facility] to assist with additional [facility] services as well as,  services for the [employer] organization and assisting with social security application.  3.   Information in this assessment was obtained from the medical record and provided by patient who is a good historian.   4.   Follow up scheduled:   January 27 10:30 AM        Patient was given the following to do until next session:     Establish primary physician  5.  Anticipated Discharge: Anticipated Discharge Time: 1 - 3 Months   6. Is this the patient's last discharge: No      Procedure Code:  Psychotherapy (with patient) - 45 [CPT 71320]    Ania Lancaster, [facility]  1/20/25  Suicide Risk and Safety Concerns were assessed for. Patient meets the following risk assessment and triage:  Eric Brown safety plan was completed.  Eric-Brown Safety Plan       Step 1: Warning signs:    Warning Signs    isolating, not wanting to be around others.      Step 2: Internal coping strategies - Things I can do to take my mind off my problems without contacting another person:    Strategies    Listen to music. Listen to TV.      Step 3: People and social settings that provide distraction:    Name Contact Information    significant other. Patricia Navarro ([phone])          Step 4: People whom I can ask for help during a crisis:    Name Contact Information    significant other. Patricia Navarro ([phone])       Step 5: Professionals or agencies I can contact during a crisis:    Clinician/Agency Name Phone Emergency Contact    [facility]        Local Emergency Department Emergency Department Address Emergency Department Phone    [phone]        Suicide Prevention Lifeline Phone: Call or Text 213  Crisis Text Line: Text HOME to 450700     Step 6: Making the environment safer (plan for lethal means safety):   Did not identify any lethal methods     Optional: What is most important to me and worth  living for?:      Edward Safety Plan. Jessica Reyes and Cholo Maya. Used with permission of the authors.

## 2025-01-20 NOTE — TELEPHONE ENCOUNTER
Reached patient and scheduled following appointment via [facility]:    Date: Monday, 1/27/2025  Time: 1:00 pm - 2:00 pm  Provider: Chloe FRY  LICSW  Location: [address], [place] [address]  Phone: [phone]  Type: Therapy - Initial (In-Person)    Instructions:  When possible clients should be asked to email us at [email or web address] or call and leave us a message at [phone] to let us know they have an appointment scheduled through [facility]. We will then send the appropriate paperwork to their email to complete before the session.    Our building has ample parking. Enter into either door and proceed to the second level, [address]. Our website is [email or web address]. All paperwork is required to be completed electronically and 24 hours in advance of your appointment, whenever possible. If paperwork isn't completed, the appointment may be cancelled. Appointments begin on the hour, it is helpful to arrive 5 to 10 minutes ahead of time.    Appt info emailed to patient as requested.    Chloe Grier  [facility] Coordinator  01/20/25 4:29 PM

## 2025-01-21 ENCOUNTER — TELEPHONE (OUTPATIENT)
Dept: OPHTHALMOLOGY | Facility: CLINIC | Age: 65
End: 2025-01-21
Payer: COMMERCIAL

## 2025-01-21 NOTE — TELEPHONE ENCOUNTER
[facility] Call Center    Phone Message    May a detailed message be left on voicemail: yes     Reason for Call: Order(s): Other:   Reason for requested:  order, Site specific, 9050.034  Date needed: as soon as possible  Provider name: Amber Jorge with [facility] called wondering if the provider could send in an order for . Please call to advise, number listed is her personal cell and she will answer any questions.    Action Taken: Message routed to:  [facility] eye    Travel Screening: Not Applicable

## 2025-01-21 NOTE — TELEPHONE ENCOUNTER
Called [facility] times 2 today and left message with direct number.    -- clinic able to assist in referral to social work, but would like to review specifics of request before sending.    Victor M Medrano RN 1:42 PM 01/21/25

## 2025-01-22 ENCOUNTER — DOCUMENTATION ONLY (OUTPATIENT)
Dept: BEHAVIORAL HEALTH | Facility: CLINIC | Age: 65
End: 2025-01-22
Payer: COMMERCIAL

## 2025-01-22 NOTE — PROGRESS NOTES
Writer contacted patient in regard to  plans for [facility]   to contact him for assistance with services.   also, to inform him of the therapy appointment that was set up for him on 1/27/1/27/25 with long-term therapist   At [facility].   information was also emailed patient due to vision issues.      Ania Lancaster [facility]  1/22/25

## 2025-01-31 ENCOUNTER — PATIENT OUTREACH (OUTPATIENT)
Dept: CARE COORDINATION | Facility: CLINIC | Age: 65
End: 2025-01-31
Payer: COMMERCIAL

## 2025-01-31 ENCOUNTER — TELEPHONE (OUTPATIENT)
Dept: OPHTHALMOLOGY | Facility: CLINIC | Age: 65
End: 2025-01-31
Payer: COMMERCIAL

## 2025-01-31 NOTE — TELEPHONE ENCOUNTER
01-31-25  Retrieved PtCall message for GVF+ffERG+color vision.  LV SM 01-16 but no ERG Referral in place.  Prog Notes say 1-2 wks.  Sent message to Retina (AB) to clarify color vision.      Called and spoke w/pt.  Briefly discussed testing and expected duration and dilation.  He will schedule tests recommended by Dr Gallego and would like to see Dr Gallego same day. Will message Dr Gallego and Chloe.    However, pt expressed frustration as testing will not help his financial situation (no money for utilities and phone, no job) and he needs help to apply for disability and social security immediately.  Pt mentions that a therapist was recommended but that is not going to help right now.  He has not heard from anyone else regarding his financial situation.  Will message Triage (Victor M) and Retina (Lashonda).      TT will route to  to schedule:     Date and time = Wednesday February 5 at 12:00  Eye, Electrodiagnostic == ffERG  Attending = Lashonda  Referring = Lashonda  Appt Notes = GVF+ffERG+color vision (sm, TT to do all)  Duration = 4 hours  Message = chart at  for TT    RE: info packets  Please send info packet w/appt reminder, map/directions and handouts.

## 2025-02-03 ENCOUNTER — TELEPHONE (OUTPATIENT)
Dept: OPHTHALMOLOGY | Facility: CLINIC | Age: 65
End: 2025-02-03
Payer: COMMERCIAL

## 2025-02-03 NOTE — TELEPHONE ENCOUNTER
Pt called in to reschedule 2/4 appt with Dr. Mclain. Writer was able to reschedule to 2/11 at 0845. Marii Chen on 2/3/2025 at 10:33 AM

## 2025-02-05 ENCOUNTER — ALLIED HEALTH/NURSE VISIT (OUTPATIENT)
Dept: OPHTHALMOLOGY | Facility: CLINIC | Age: 65
End: 2025-02-05
Attending: OPHTHALMOLOGY
Payer: COMMERCIAL

## 2025-02-05 ENCOUNTER — PATIENT OUTREACH (OUTPATIENT)
Dept: CARE COORDINATION | Facility: CLINIC | Age: 65
End: 2025-02-05

## 2025-02-05 DIAGNOSIS — H35.50 RETINAL DYSTROPHY: Primary | ICD-10-CM

## 2025-02-05 DIAGNOSIS — Z78.9 NEEDS ASSISTANCE WITH COMMUNITY RESOURCES: Primary | ICD-10-CM

## 2025-02-05 PROCEDURE — 92083 EXTENDED VISUAL FIELD XM: CPT

## 2025-02-05 PROCEDURE — 99207 PR NO BILLABLE SERVICE THIS VISIT: CPT | Mod: 26 | Performed by: OPHTHALMOLOGY

## 2025-02-05 PROCEDURE — 92273 FULL FIELD ERG W/I&R: CPT

## 2025-02-05 PROCEDURE — 999N000103 HC STATISTIC NO CHARGE FACILITY FEE

## 2025-02-05 PROCEDURE — 92083 EXTENDED VISUAL FIELD XM: CPT | Mod: 26 | Performed by: OPHTHALMOLOGY

## 2025-02-05 PROCEDURE — 92273 FULL FIELD ERG W/I&R: CPT | Mod: 26 | Performed by: OPHTHALMOLOGY

## 2025-02-05 ASSESSMENT — VISUAL ACUITY
CORRECTION_TYPE: GLASSES
OD_CC+: -2+1
OS_CC: 20/50
METHOD: SNELLEN - LINEAR
OD_CC: 20/60

## 2025-02-05 ASSESSMENT — REFRACTION_WEARINGRX
OS_ADD: +2.25
OD_ADD: +2.25
OS_SPHERE: -1.75
SPECS_TYPE: PAL
OD_SPHERE: -1.00

## 2025-02-05 NOTE — PROGRESS NOTES
Social Work - Intervention  [facility]  Data/Intervention:    Patient Name: Cecil Haddad Goes By: Cecil    /Age: 1960 (64 year old)     Visit Type: telephone  Referral Source: Opthalmology   Reason for Referral: Financial     Collaborated With:    -Patient, Cecil     Psychosocial Information/Concerns:  Cecil recently applied for Social Security due to not being able to see after a cataract surgery and being unable to work because of it. He is unsure if he applied correctly for Social Security and does not have his login information to check the status of his application. Cecil lives in a duplex with his roommate who has helped cover rent up until this point and he worried about eviction while he has no income.      Intervention/Education/Resources Provided:  Called Social Security on Cecil's behalf and shared with him that his claim has been submitted so nothing to do on that but wait for a response. Per pt request, emailed [employer] applying for a jayden on his behalf. Also emailed Cecil resources for [place] benefits to apply for SNAP and cash assistance. Cecil has SNAP but has a change of income so SNAP would likely change with an update to the [facility]. Also provided a link for the [facility] CAP program to apply for energy assistance. [employer] info sent as well with instructions on how to apply and utilize the voucher.      Assessment/Plan:   will remain available for support as needed.      Provided patient/family with contact information and availability.    Radha Ivan [facility]  Medical Social Worker    [facility]     [address] [place] [id number]  [email or web address]  Gender pronouns: she/her   Employed by [employer]

## 2025-02-05 NOTE — PROGRESS NOTES
STANDARD GVF+ffERG REPORT    Testing Date:  2025    RE:  Cecil Haddad  MRN:  [id number]  :  1960    CLINICAL HISTORY:Returns for color vision (Ishihara)+GVF+ffERG.  Prior GVF+ffERG .  S/P CE w/IOL both eyes  (Rayne).  Last visit w/Dr Gallego 25.  Pt states no interval changes except that he received new PG and Rx was from Dr Mclain's visit 25.      Color     Right Left   Ishihara 0/11 0/11   Used near cxn in trial frame.  Occluded fellow eye.  Only able to tell there are light vs dark spots but no number or image pops out for all plates.    IMPRESSION GVF:  Tech notes: Manual goldmann visual field discussed and performed both eyes undilated.  Color vision testing with Ishihara color plates done just prior w/near correction in trial frame.  Instructed on importance of maintaining attention and fixation for reliable responses.  Monitored and exhibited good to excellent central fix both eyes.    NOTE: Arrives w/sleepy affect and droopy lids.  May also be staring at times.    NOTE:  Also very difficult for pt to maintain forehead touching headband in [place].    RE:  Did not visualize I2e.  Intermittent response to I3e within central 10 degrees superiorly.  VERY SLOW to static check centrally and peripherally.  Inconsistent to kinetic testing for isopters and static check for upper nasal quadrant right eye.   Periphery V43 120 degrees V 100 degrees;   Central scotoma with I4e annd II4e    LE:  Did not visualize I2e and I3e.  VERY SLOW to static check centrally and peripherally.  Inconsistent to kinetic testing for isopters and static check for upper temporal quadrant left eye.  Periphery V43 120 degrees V 110 degrees;   Central scotoma with I4e and paracentral scotomas with II4e    Worsening of central scotomas compared to 2021    IMPRESSION ffERG:                    Visual Acuity Right Eye : 20/60-2+1, PHNI      W/gls & IOL, -0.75 + 0.75x017    Visual Acuity Left Eye  : 20/50, PHNI      W/gls & IOL, -1.75 + 0.50x026                                   ALL AVERAGED                   Data for Full-Field ERG  Right Eye  Left Eye   DARK-ADAPTED Patient Normal Patient   0.01 ERG (nicholas) b-wave amplitude ( v) ---- 69.6 to 348.2 ----   0.01 ERG (nicholas) b-wave implicit time (ms) ---- 78.2 to 117.2 ----         3.0 ERG (combined) a-wave amplitude ( v) -23 -259.4 to -98 -28   3.0 ERG (combined) a-wave implicit time (ms) 22 11.5 to 20.3 22   3.0 ERG (combined) b-wave amplitude ( v) 23 159.2 to 421.6 37   3.0 ERG (combined) b-wave implicit time (ms) 60 41.2 to 57.2 59         10.0 ERG (brighter combined)a-wave amplitude ( v) -30 -291 to -110 -39   10.0 ERG (brighter combined)a-wave implicit time (ms) 17 9.9 to 15.1 17   10.0 ERG (brighter combined)b-wave amplitude ( v) 27 191.5 to 403.1 45   10.0 ERG (brighter combined)b-wave implicit time (ms) 65 39.1 to 55.3 59         3.0 Oscillatory Potentials  present    LIGHT-ADAPTED       3.0 Flicker (30Hz) amplitude ( v) 13 56.4 to 151.2 14   3.0 Flicker (30Hz) implicit time (ms) 35 21.8 to 31.6 35         3.0 ERG (cone) a-wave amplitude ( v) -7 -45.1 to -13.7 -7   3.0 ERG (cone) a-wave implicit time (ms) 20 11.4 to 16.8 20   3.0 ERG (cone) b-wave amplitude ( v) 15 62.4 to 174.2 15   3.0 ERG (cone) b-wave implicit time (ms) 38 26.8 to 34.2 38.5   * = manipulated cursors  parentheses = cursors at selected peaks  ---- = residual to non-measurable  xxxx = not tested      Tech notes: ffERG discussed and performed with E3 system.  Manual GVF done just prior, both eyes undilated.  Color vision testing w/Ishihara color plates also done just prior w/near correction in trial frame.  Equally well-dilated ~9mm.  Tolerated dtl nicely.  Equal and adequate eye opening w/easy effort needed to clear dilated pupils as needed.  Impedances low and comparable throughout.  No difficulty w/blinking itself.  However, tendency to blink multiple times in a row just prior to flash  stimulus for DA bright flashes-->used multiple single flashes to obtain an averaged response.  Specifically, no eyelid flutter to bright flashes and hardly any observed for flicker.      NOTE:  Eyes shut during dark adaptation but not sleeping.  Occasional yawn.  Monitored for adequate eye opening and instructed to open eyes at warning beeps.      Extended protocols done per mention of DDx CAR/AIR.  H/o renal cell carcinoma; status post kidney removal 2004.  Diagnosys On-Off (3-step, E3) done.  +Tearing for all steps and difficulty tolerating combined On-Off stimulus.  Deferred RETeval due to tearing and fatigue.      INTERPRETATION:  This full field electroretinogram was performed according to ISCEV standards using ESPION E3 system and DTL fiber recording electrodes. The patient tolerated the testing well.  The waveforms are fairly reproducible and well formed.  The normative values provided above represent the 95% confidence limits for a normal individual the age of the patient. The patient s responses are averaged.   There is mild asymmetry of the responses in between both eyes.  In dark adapted conditions, the nicholas-specific responses were residual to non measurable  The maximal response, a combined nicholas and cone responses, have severe reduced amplitudes in both eyes and the implicit time is delayed in both eyes .  With a higher intensity flash, the responses improve, but persistently reduced in both eyes.  The bright flash (scotopic 10.0) response is electronegative.  The oscillatory potentials are reduced bilaterally.      In light adapted conditions,  the 30-Hz flicker responses have abnormal amplitudes and the implicit time is delayed in both eyes.    The single photopic response are abnormal in both eyes     The on and off reseponses were also attenuated    Conclusion:  This represents an abnormal electroretinogram consistent with the diagnosis of advanced  nicholas cone dystrophy.   The ffERG shows severe  reduction of the nicholas response in both eyes, with moderate reduction of the cone response.     The responses are electronegative and etiologies like AIR, CAR are in the differential diagnosis.   However, Compared with the previous ERG performed in 2021 There is some test-retest variability, but the current ERG shows stability without significant worsening of the nicholas cone responses. This is reassuring.  The goldmann visual field  demonstrated worsening of central scotomas compared to 7/2021    Clinical correlation is recommended.    A  repeat electroretinogram could be considered in 1-2 years or earlier if the clinical situation changes.     Thank you for the opportunity to provide electrophysiologic services for this patient.  Please do not hesitate to call if there should be any questions regarding these results.      Kim Gallego MD  Professor of ophthalmology   Electrophysiology Service   Department of Ophthalmology & Visual Neurosciences   [facility]  Phone: [phone]   Fax: [phone]

## 2025-02-05 NOTE — NURSING NOTE
Returns for color vision (Ishihara)+GVF+ffERG.  Prior GVF+ffERG .  S/P CE w/IOL both eyes 2024 (Rayne).  Last visit w/Dr Gallego 01-16-25.  Pt states no interval changes except that he received new PG and Rx was from Dr Mclain's visit 01-07-25.  Wants to be seen by Dr Gallego to discuss test results and pt scheduled for next ur Feb 13.

## 2025-02-05 NOTE — LETTER
2025      RE: Cecil Haddad  [id number] [address] [address]       STANDARD GVF+ffERG REPORT    Testing Date:  2025    RE:  Ceicl Haddad  MRN:  [id number]  :  1960    CLINICAL HISTORY:Returns for color vision (Ishihara)+GVF+ffERG.  Prior GVF+ffERG .  S/P CE w/IOL both eyes  (Rayne).  Last visit w/Dr Gallego 25.  Pt states no interval changes except that he received new PG and Rx was from Dr Mclain's visit 25.      Color     Right Left   Ishihara 0/11 0/11   Used near cxn in trial frame.  Occluded fellow eye.  Only able to tell there are light vs dark spots but no number or image pops out for all plates.    IMPRESSION GVF:  Tech notes: Manual goldmann visual field discussed and performed both eyes undilated.  Color vision testing with Ishihara color plates done just prior w/near correction in trial frame.  Instructed on importance of maintaining attention and fixation for reliable responses.  Monitored and exhibited good to excellent central fix both eyes.    NOTE: Arrives w/sleepy affect and droopy lids.  May also be staring at times.    NOTE:  Also very difficult for pt to maintain forehead touching headband in [place].    RE:  Did not visualize I2e.  Intermittent response to I3e within central 10 degrees superiorly.  VERY SLOW to static check centrally and peripherally.  Inconsistent to kinetic testing for isopters and static check for upper nasal quadrant right eye.   Periphery V43 120 degrees V 100 degrees;   Central scotoma with I4e annd II4e    LE:  Did not visualize I2e and I3e.  VERY SLOW to static check centrally and peripherally.  Inconsistent to kinetic testing for isopters and static check for upper temporal quadrant left eye.  Periphery V43 120 degrees V 110 degrees;   Central scotoma with I4e and paracentral scotomas with II4e    Worsening of central scotomas compared to 2021    IMPRESSION ffERG:                    Visual Acuity Right  Eye : 20/60-2+1, PHNI      W/gls & IOL, -0.75 + 0.75x017    Visual Acuity Left Eye : 20/50, PHNI      W/gls & IOL, -1.75 + 0.50x026                                   ALL AVERAGED                   Data for Full-Field ERG  Right Eye  Left Eye   DARK-ADAPTED Patient Normal Patient   0.01 ERG (nicholas) b-wave amplitude ( v) ---- 69.6 to 348.2 ----   0.01 ERG (nicholas) b-wave implicit time (ms) ---- 78.2 to 117.2 ----         3.0 ERG (combined) a-wave amplitude ( v) -23 -259.4 to -98 -28   3.0 ERG (combined) a-wave implicit time (ms) 22 11.5 to 20.3 22   3.0 ERG (combined) b-wave amplitude ( v) 23 159.2 to 421.6 37   3.0 ERG (combined) b-wave implicit time (ms) 60 41.2 to 57.2 59         10.0 ERG (brighter combined)a-wave amplitude ( v) -30 -291 to -110 -39   10.0 ERG (brighter combined)a-wave implicit time (ms) 17 9.9 to 15.1 17   10.0 ERG (brighter combined)b-wave amplitude ( v) 27 191.5 to 403.1 45   10.0 ERG (brighter combined)b-wave implicit time (ms) 65 39.1 to 55.3 59         3.0 Oscillatory Potentials  present    LIGHT-ADAPTED       3.0 Flicker (30Hz) amplitude ( v) 13 56.4 to 151.2 14   3.0 Flicker (30Hz) implicit time (ms) 35 21.8 to 31.6 35         3.0 ERG (cone) a-wave amplitude ( v) -7 -45.1 to -13.7 -7   3.0 ERG (cone) a-wave implicit time (ms) 20 11.4 to 16.8 20   3.0 ERG (cone) b-wave amplitude ( v) 15 62.4 to 174.2 15   3.0 ERG (cone) b-wave implicit time (ms) 38 26.8 to 34.2 38.5   * = manipulated cursors  parentheses = cursors at selected peaks  ---- = residual to non-measurable  xxxx = not tested        Tech notes: ffERG discussed and performed with E3 system.  Manual GVF done just prior, both eyes undilated.  Color vision testing w/Ishihara color plates also done just prior w/near correction in trial frame.  Equally well-dilated ~9mm.  Tolerated dtl nicely.  Equal and adequate eye opening w/easy effort needed to clear dilated pupils as needed.  Impedances low and comparable throughout.  No difficulty  w/blinking itself.  However, tendency to blink multiple times in a row just prior to flash stimulus for DA bright flashes-->used multiple single flashes to obtain an averaged response.  Specifically, no eyelid flutter to bright flashes and hardly any observed for flicker.      NOTE:  Eyes shut during dark adaptation but not sleeping.  Occasional yawn.  Monitored for adequate eye opening and instructed to open eyes at warning beeps.      Extended protocols done per mention of DDx CAR/AIR.  H/o renal cell carcinoma; status post kidney removal 2004.  Diagnosys On-Off (3-step, E3) done.  +Tearing for all steps and difficulty tolerating combined On-Off stimulus.  Deferred RETeval due to tearing and fatigue.    INTERPRETATION:  This full field electroretinogram was performed according to ISCEV standards using ESPION E3 system and DTL fiber recording electrodes. The patient tolerated the testing well.  The waveforms are fairly reproducible and well formed.  The normative values provided above represent the 95% confidence limits for a normal individual the age of the patient. The patient s responses are averaged.   There is mild asymmetry of the responses in between both eyes.  In dark adapted conditions, the nicholas-specific responses were residual to non measurable  The maximal response, a combined nicholas and cone responses, have severe reduced amplitudes in both eyes and the implicit time is delayed in both eyes .  With a higher intensity flash, the responses improve, but persistently reduced in both eyes.  The bright flash (scotopic 10.0) response is electronegative.  The oscillatory potentials are reduced bilaterally.      In light adapted conditions,  the 30-Hz flicker responses have abnormal amplitudes and the implicit time is delayed in both eyes.    The single photopic response are abnormal in both eyes     The on and off reseponses were also attenuated    Conclusion: This represents an abnormal electroretinogram  consistent with the diagnosis of advanced  nicholas cone dystrophy.  The ffERG shows severe reduction of the nicholas response in both eyes, with moderate reduction of the cone response.     The responses are electronegative and etiologies like AIR, CAR are in the differential diagnosis.   However, Compared with the previous ERG performed in 2021 There is some test-retest variability, but the current ERG shows stability without significant worsening of the nicholas cone responses. This is reassuring.  The goldmann visual field  demonstrated worsening of central scotomas compared to 7/2021    Clinical correlation is recommended.    A  repeat electroretinogram could be considered in 1-2 years or earlier if the clinical situation changes.           Thank you for the opportunity to provide electrophysiologic services for this patient.  Please do not hesitate to call if there should be any questions regarding these results.      Kim Gallego MD  Professor of ophthalmology   Electrophysiology Service   Department of Ophthalmology & Visual Neurosciences   [facility]  Phone: [phone]   Fax: [phone]

## 2025-02-06 ENCOUNTER — TELEPHONE (OUTPATIENT)
Dept: OPHTHALMOLOGY | Facility: CLINIC | Age: 65
End: 2025-02-06
Payer: COMMERCIAL

## 2025-02-11 ENCOUNTER — OFFICE VISIT (OUTPATIENT)
Dept: OPHTHALMOLOGY | Facility: CLINIC | Age: 65
End: 2025-02-11
Payer: COMMERCIAL

## 2025-02-11 DIAGNOSIS — H52.203 MYOPIA OF BOTH EYES WITH ASTIGMATISM AND PRESBYOPIA: ICD-10-CM

## 2025-02-11 DIAGNOSIS — H35.50 RETINAL DYSTROPHY: Primary | ICD-10-CM

## 2025-02-11 DIAGNOSIS — H52.4 MYOPIA OF BOTH EYES WITH ASTIGMATISM AND PRESBYOPIA: ICD-10-CM

## 2025-02-11 DIAGNOSIS — Z96.1 PSEUDOPHAKIA, BOTH EYES: ICD-10-CM

## 2025-02-11 DIAGNOSIS — H52.13 MYOPIA OF BOTH EYES WITH ASTIGMATISM AND PRESBYOPIA: ICD-10-CM

## 2025-02-11 PROCEDURE — 99024 POSTOP FOLLOW-UP VISIT: CPT | Performed by: OPHTHALMOLOGY

## 2025-02-11 ASSESSMENT — REFRACTION_WEARINGRX
SPECS_TYPE: PAL
OD_CYLINDER: SPHERE
OS_ADD: +2.25
OD_SPHERE: -1.00
OD_ADD: +2.25
OS_CYLINDER: SPHERE
OS_SPHERE: -1.75

## 2025-02-11 ASSESSMENT — SLIT LAMP EXAM - LIDS
COMMENTS: NORMAL
COMMENTS: UPPER LID TRICHIASIS

## 2025-02-11 ASSESSMENT — TONOMETRY
OD_IOP_MMHG: 10
OS_IOP_MMHG: 12
IOP_METHOD: TONOPEN

## 2025-02-11 ASSESSMENT — VISUAL ACUITY
CORRECTION_TYPE: GLASSES
METHOD: SNELLEN - LINEAR
OS_CC+: -1
OD_CC: 20/50
OS_CC: 20/50

## 2025-02-11 ASSESSMENT — EXTERNAL EXAM - RIGHT EYE: OD_EXAM: NORMAL

## 2025-02-11 ASSESSMENT — EXTERNAL EXAM - LEFT EYE: OS_EXAM: NORMAL

## 2025-02-11 NOTE — PROGRESS NOTES
HPI       Post Op (Ophthalmology) Left Eye    In left eye.  Since onset it is stable.  Associated symptoms include photophobia.  Negative for eye pain, flashes and floaters.  Treatments tried include eye drops.             Comments    Here for post op left eye. VA is about the same. Compliant with drops. Some photophobia. No pain. No flashes or floaters.    Ja MCFARLAND 8:28 AM February 11, 2025             Last edited by Ja Duncan on 2/11/2025  8:29 AM.           Review of systems for the eyes was negative other than the pertinent positives/negatives listed in the HPI.      Assessment & Plan    HPI:  Cecil Haddad is a 64 year old male with history of myopia, retinal dystrophy presents for Postoperative month 3. Using drops intermittently, sometimes left, sometimes both eyes.    He was unable to get durezol due to insurance purposes      POHx: cone-nicholas dystrophy, Cataract extraction/intraocular lens   PMHx:   Current Medications:   Current Outpatient Medications   Medication Sig Dispense Refill    ASPIRIN 81 PO Take 1 tablet by mouth daily.      fluorometholone (FML LIQUIFILM) 0.1 % ophthalmic suspension Place 1 drop Into the left eye 4 times daily. 5 mL 2    ketorolac (ACULAR) 0.5 % ophthalmic solution Place 1 drop Into the left eye 4 times daily. 5 mL 3    magnesium oxide (MAG-OX) 400 MG tablet Take 1 tablet (400 mg) by mouth daily. 60 tablet 5    omeprazole (PRILOSEC) 20 MG DR capsule Take 1 capsule (20 mg) by mouth daily. 90 capsule 1    potassium chloride nancy ER (KLOR-CON M20) 20 MEQ CR tablet Take 2 tablets (40 mEq) by mouth 2 times daily. 240 tablet 5     No current facility-administered medications for this visit.     FHx: denies family history of ocular conditions   PSHx: Cataract extraction/intraocular lens Mclain right eye 10/18/24, left eye 11/01/24     Current Eye Medications:  Ketorolac twice a day left eye   FML two times a day left eye     Assessment & Plan:  Pseudophakia, left eye  Mclain  left eye 11/01/24   Nearly resolved rebound inflammation today on ketorolac, pred forte and FML qid    No retained fragment on gonioscopy 12/03/24  May have had ineffective pred acetate bottle or confusion with drop administration  Taper drops daily x 1 week, then stop    Pseudophakia, right eye  Rayne right eye 10/18/24   Doing well  MRx dispensed previously for right eye     (H43.391) Vitreous floaters of right eye  (primary encounter diagnosis)  No holes or tears 360    (H52.13,  H52.203,  H52.4) Myopia of both eyes with astigmatism and presbyopia  Manifest refraction dispensed previously     (H04.122) Dry eye syndrome of left eye    (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        Johny Mclain MD     Attending Physician Attestation:  Complete documentation of historical and exam elements from today's encounter can be found in the full encounter summary report (not reduplicated in this progress note).  I personally obtained the chief complaint(s) and history of present illness.  I confirmed and edited as necessary the review of systems, past medical/surgical history, family history, social history, and examination findings as documented by others; and I examined the patient myself.  I personally reviewed the relevant tests, images, and reports as documented above.  I formulated and edited as necessary the assessment and plan and discussed the findings and management plan with the patient and family. - Johny Mclain MD

## 2025-02-13 ENCOUNTER — OFFICE VISIT (OUTPATIENT)
Dept: OPHTHALMOLOGY | Facility: CLINIC | Age: 65
End: 2025-02-13
Attending: OPHTHALMOLOGY
Payer: COMMERCIAL

## 2025-02-13 ENCOUNTER — PATIENT OUTREACH (OUTPATIENT)
Dept: CARE COORDINATION | Facility: CLINIC | Age: 65
End: 2025-02-13

## 2025-02-13 DIAGNOSIS — H35.50 RETINAL DYSTROPHY: Primary | ICD-10-CM

## 2025-02-13 ASSESSMENT — REFRACTION_WEARINGRX
OD_ADD: +2.25
OD_SPHERE: -1.00
SPECS_TYPE: PAL
OS_CYLINDER: SPHERE
OS_SPHERE: -1.75
OD_CYLINDER: SPHERE
OS_ADD: +2.25

## 2025-02-13 ASSESSMENT — SLIT LAMP EXAM - LIDS
COMMENTS: NORMAL
COMMENTS: UPPER LID TRICHIASIS

## 2025-02-13 ASSESSMENT — CUP TO DISC RATIO
OD_RATIO: 0.4
OS_RATIO: 0.4

## 2025-02-13 ASSESSMENT — VISUAL ACUITY
CORRECTION_TYPE: GLASSES
OD_CC+: -2
OS_CC: 20/40
METHOD: SNELLEN - LINEAR
OD_CC: 20/60

## 2025-02-13 ASSESSMENT — TONOMETRY
OD_IOP_MMHG: 12
OS_IOP_MMHG: 13
IOP_METHOD: TONOPEN

## 2025-02-13 ASSESSMENT — EXTERNAL EXAM - LEFT EYE: OS_EXAM: NORMAL

## 2025-02-13 ASSESSMENT — EXTERNAL EXAM - RIGHT EYE: OD_EXAM: NORMAL

## 2025-02-13 NOTE — PROGRESS NOTES
CC: decreased vision both eyes   INTERVAL HISTORY -  Patient reports progressive vision loss that started about 5 years ago. Patient is very bothered by nyctalopia    Patient is now s/p cataract surgery OU 10/2024 with Dr. Mclain. Patient says the vision has been worse since cataract surgery.   Patient also expressing significant distress over housing insecurity and financial concerns that he attributes to low vision. 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     [facility] - Cecil Haddad is a  64 year old year-old patient originally referred by Dr Millan for evaluation and treat of retinal macular atrophy.  Patient reports that starting in 2017 he reports that things are grey and cloudy during the day. Difficulty seeing at night, with everything being dark. Also has trouble seeing in normal lighting condition and his home is very brightly lit.   He reports that he first noticed this about 3 years ago when he started getting a red tinged visual field. He reports that is has progressed since then and is getting worse at this time. He does report that it depends on the day in terms of his symptoms. He reports kerry days are especially bad with his visual field being washed out.   Has had occasional flashes of lights in his eyes - usually around peripheral of eyes 1-2 x/month - since 9/2020. Has floaters life long - not increased.   No other symptoms. No headaches. He does report hearing difficulties (ongoing for a long while - working around loud machinery / white noise / static).     Smokes 1 PPD and uses marijuana. Last had alcohol about 12 years ago  Patient takes vitamin supplements - Multipurpose vitamins 2-3x/week.   History of kidney cancer - diagnosed in 2004 s/p resection. Last followed 16-17 years ago. No recent hx of bloody urine.    History of pancreatitis     No family history of vision loss. No family history known but limited family members. (Has a sister -  doesn't know about Hx; Did not know Father. Mother worse glasses although no hx of glasses usage).     Retinal Dystrophy assessment:  Nyctalopia: YES  Problems going from outside bright light to inside: yes  Problems going from inside to bright light outside:yes  Photosensitivity:YES  Problems with steps, curbs, or stairs: no  Problems with color vision: yes  Sees flashing lights: no   Hearing loss: no       RETINAL IMAGING:  Goldmann visual field  02/13/25   Right eye:  central spots of decreased sensitivity with I4e; II4e; three paracentral scotomas with II4 e and I4 e. Mild constriction of the visual field H 120; V 110 degrees  Left eye: central scotoma within 20 degrees with I4e; central denser scotoma within 10 degrees with II4e. Mild constriction of the visual field H 115; V 100 degrees.  Compared to prior goldmann visual field  from 2021, there is progression of the central scotomas and constriction of both eyes     ffERG  02/13/25 : both eyes - mod/severely attenuated nicholas/cone response with increased implicit times; decreased b/a ratio approaching electronegativity. Compared to prior electroretinogram  from 2021, this electroretinogram  shows inter test variability but not significant progression. The on and off responses were attenuated.    OCT 01/16/25:  OD - PVD, loss of photoreceptor in the perifoveal region, thin choroid  OS - PVD, loss of photoreceptor slightly more extensive than the right, thin choroid     AF 01/16/25:  OD - hyper-FAF ring surrounding fovea. Loss of hypo-FAF of fovea. Patches of hypo-FAF, peripapillary hyper-FAF  OS - hyper-FAF ring surrounding fovea. Loss of hypo-FAF of fovea. Patches of hypo-FAF, peripapillary hyper-FAF    OCT RNFL 4/13/21: OU - normal  fluorescein angiography: 09/13/21   Normal AV and choroidal filling. Mild peripheral vascular leakage and vascular attenuation. Non specific. No clear vasculitis and possible window's defects. No late optic nerve  leakage    ASSESSMENT & PLAN    #  nicholas cone dysfunction both eyes   - Late presentation with nyctalopia  - ffERG 7/5/21 and 02/13/25 c/w nicholas-cone dystrophy and approaching electronegativity. No significant progression; but inter-test variability  - 02/13/25 goldmann visual field showed progression of the central scotomas and constriction of both eyes compared to prior from 2021  - patient never completed recommended genetic counseling or other laboratory work up   - cannot r/o CAR/AIR. H/o renal cell carcinoma; status post kidney removal 2004  - recommend  genetic testing and anti-recoverin,FTA ABS, TB quantiferon and ANAS    - consider second opinion with uveitis to rule out AIR/ car/ inflammatory conditions    #  Pseudophakia OU s/p cataract surgery RE 10/18/24 and LE 11/1/24  - observe     # last eye examination noted patient to have Depression with suicidal ideation   # Housing instability   - Patient expressed significant distress regarding current social circumstances and ability to pay rent. Also expressed significant distress about his vision and his inability to improve his vision, which is preventing him from securing a job    - Patient with high PHQ score today (23)   - Patient with no active SI today. Patient wishes to go home to support his partner who recently suffered an ankle injury. Patient also does not have concrete plans for suicide.    - Patient with previous recent referrals to social work and mental health services but patient did not proceed; patient interested in another referral   - [facility]. [facility] offered a same day appointment today but patient declined due to needing to go  his partner from the hospital. However, patient agreeable to meeting with the [facility] early next week    - Scheduled an appointment with a therapist for 1/20 at 10:30 AM    - Ordered free glasses for the patient through [employer];  explained to patient that the glasses will arrive in 2-3 weeks.   02/13/25He received prescription over the mail, but still not good enough for reading and to do the ADL  Patient states he has a lot of financial difficulties    RTC: follow up with uveitis next available to help rule out AIR/ car/ inflammatory conditions  Recommend evaluation with  - patient is visual impaired and has difficulty finding job. His has significant finantial difficulties.  Recommend follow up with low vision therapy - patient states he will have an elijah with low vision in [place] in March   Recommend low vision refraction -- patient with difficulty reading -- patient states he will have an elijah with low vision in [place] in March  and they might give him a new prescription  Lab work up test ordered: anti-recoverin,FTA ABS, TB quantiferon and ANAS  Genetic counseling referral placed and patient prefers virtual visit  Will follow up labs  Follow up with retina in 3 months, however OK to follow up with uveitis only and retina as needed     ~~~~~~~~~~~~~~~~~~~~~~~~~~~~~~~~~~   Complete documentation of historical and exam elements from today's encounter can be found in the full encounter summary report (not reduplicated in this progress note).  I personally obtained the chief complaint(s) and history of present illness.  I confirmed and edited as necessary the review of systems, past medical/surgical history, family history, social history, and examination findings as documented by others; and I examined the patient myself.  I personally reviewed the relevant tests, images, and reports as documented above.  I formulated and edited as necessary the assessment and plan and discussed the findings and management plan with the patient and family    Kim Gallego MD   of Ophthalmology.  Retina Service   Department of Ophthalmology and Visual Neurosciences   [facility]  Phone: [phone]    Fax: [phone]    The longitudinal plan of care for the diagnosis(es)/condition(s) as documented were addressed during this visit. Due to the added complexity in care, I will continue to support Cecil Haddad in the subsequent management and with the ongoing continuity of care.       45 minutes spent by me on the date of the encounter doing chart review, history and exam, documentation and further activities per the note  Low vision rehabilitation at next available

## 2025-02-13 NOTE — NURSING NOTE
Chief Complaints and History of Present Illnesses   Patient presents with    Follow Up     Chief Complaint(s) and History of Present Illness(es)       Follow Up              Course: stable    Associated symptoms: photophobia.  Negative for eye pain, flashes and floaters    Treatments tried: eye drops              Comments    Pt here to discuss ERG results with Dr. Gallego.     Ocular Meds - pt states he is using 2 bottles but not sure which ones:   Pred BID left eye per pt  Ketorolac BID left eye  FML BID left eye     Roxanne Saunders OA 1:11 PM February 13, 2025

## 2025-02-13 NOTE — PROGRESS NOTES
Social Work - Follow-Up  [facility]    Data/Intervention:    Patient Name: Cecil Haddad Goes By: Cecil    /Age: 1960 (64 year old)    Reason for Follow-Up:  Financial resources    Collaborated With:    -Patient, Cecil     Intervention/Education/Resources Provided:  Cecil reported that his Social Security check finally went through and he also has worked with the [place] [employer] and they provided him with some supplies that are helping him live more independently with his vision loss.     Assessment/Plan:   will remain available for support as needed.     Previously provided patient/family with writer's contact information and availability.      AR Boland  Medical Social Worker    [facility]     [address] [place] [id number]  [email or web address]  Gender pronouns: she/her   Employed by [employer]

## 2025-02-13 NOTE — PROGRESS NOTES
CC: decreased vision both eyes   INTERVAL HISTORY -  Patient's last visit with Dr. Gallego was 9/13/2021. Patient reports progressive vision loss that started about 5 years ago. Patient is very bothered by night vision.   Patient is now s/p cataract surgery OU 10/2024 with Dr. Mclain. Patient says the vision has been worse since cataract surgery.   Patient also expressing significant distress over housing insecurity and financial concerns that he attributes to low vision. 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     [facility] - Cecil Haddad is a  64 year old year-old patient originally referred by Dr Millan for evaluation and treat of retinal macular atrophy.  Patient reports that starting in 2017 he reports that things are grey and cloudy during the day. Difficulty seeing at night, with everything being dark. Also has trouble seeing in normal lighting condition and his home is very brightly lit.   He reports that he first noticed this about 3 years ago when he started getting a red tinged visual field. He reports that is has progressed since then and is getting worse at this time. He does report that it depends on the day in terms of his symptoms. He reports kerry days are especially bad with his visual field being washed out.   Has had occasional flashes of lights in his eyes - usually around peripheral of eyes 1-2 x/month - since 9/2020. Has floaters life long - not increased.   No other symptoms. No headaches. He does report hearing difficulties (ongoing for a long while - working around loud machinery / white noise / static).     Smokes 1 PPD and uses marijuana. Last had alcohol about 12 years ago  Patient takes vitamin supplements - Multipurpose vitamins 2-3x/week.   History of kidney cancer - diagnosed in 2004 s/p resection. Last followed 16-17 years ago. No recent hx of bloody urine.    History of pancreatitis     No family history of vision loss. No family  history known but limited family members. (Has a sister - doesn't know about Hx; Did not know Father. Mother worse glasses although no hx of glasses usage).     RETINAL IMAGING:    OCT 01/16/25:  OD - PVD, loss of photoreceptor in the perifoveal region, thin choroid  OS - PVD, loss of photoreceptor slightly more extensive than the right, thin choroid     AF 01/16/25:  OD - hyper-FAF ring surrounding fovea. Loss of hypo-FAF of fovea. Patches of hypo-FAF, peripapillary hyper-FAF  OS - hyper-FAF ring surrounding fovea. Loss of hypo-FAF of fovea. Patches of hypo-FAF, peripapillary hyper-FAF    OCT RNFL 4/13/21: OU - normal    ffERG  7-5-21: both eyes - mod/severely attenuated nicholas/cone response with mild increased implicit times    fluorescein angiography: 09/13/21   Normal AV and choroidal filling. Mild peripheral vascular leakage and vascular attenuation. Non specific. No clear vasculitis and possible window's defects. 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 H/o renal cell carcinoma; status post kidney removal 2004  - patient needs repeat ffERG and GVF to monitor for progression of dystrophy   - will consider genetic testing and anti-recoverin,FTA ABS, TB quantiferon and ANAS    #  Pseudophakia OU s/p cataract surgery RE 10/18/24 and LE 11/1/24  - observe     # Depression with suicidal ideation   # Housing instability   - Patient expressed significant distress regarding current social circumstances and ability to pay rent. Also expressed significant distress about his vision and his inability to improve his vision, which is preventing him from securing a job    - Patient with high PHQ score today (23)   - Patient with no active SI  today. Patient wishes to go home to support his partner who recently suffered an ankle injury. Patient also does not have concrete plans for suicide.    - Patient with previous recent referrals to social work and mental health services but patient did not proceed; patient interested in another referral   - [facility]. [facility] offered a same day appointment today but patient declined due to needing to go  his partner from the hospital. However, patient agreeable to meeting with the [facility] early next week    - Scheduled an appointment with a therapist for 1/20 at 10:30 AM    - Ordered free glasses for the patient through [employer]; explained to patient that the glasses will arrive in 2-3 weeks.     # Low vision   - Will place a referral     RTC: 1-2 weeks with ERG, GVF, color vision  to assess progression of the disease    ~~~~~~~~~~~~~~~~~~~~~~~~~~~~~~~~~~   Complete documentation of historical and exam elements from today's encounter can be found in the full encounter summary report (not reduplicated in this progress note).  I personally obtained the chief complaint(s) and history of present illness.  I confirmed and edited as necessary the review of systems, past medical/surgical history, family history, social history, and examination findings as documented by others; and I examined the patient myself.  I personally reviewed the relevant tests, images, and reports as documented above.  I personally reviewed the ophthalmic test(s) associated with this encounter, agree with the interpretation(s) as documented by the resident/fellow, and have edited the corresponding report(s) as necessary.   I formulated and edited as necessary the assessment and plan and discussed the findings and management plan with the patient and family    Kim Gallego MD   of Ophthalmology.  Retina Service   Department of Ophthalmology and  Visual Neurosciences   [facility]  Phone: [phone]   Fax: [phone]    The longitudinal plan of care for the diagnosis(es)/condition(s) as documented were addressed during this visit. Due to the added complexity in care, I will continue to support Cecil Haddad in the subsequent management and with the ongoing continuity of care.

## 2025-03-04 ENCOUNTER — TELEPHONE (OUTPATIENT)
Dept: CONSULT | Facility: CLINIC | Age: 65
End: 2025-03-04
Payer: COMMERCIAL

## 2025-03-04 NOTE — TELEPHONE ENCOUNTER
LVM for patient to call back to schedule GC only visit with Geri Bah. My direct number provided.

## 2025-04-29 ENCOUNTER — THERAPY VISIT (OUTPATIENT)
Dept: OCCUPATIONAL THERAPY | Facility: CLINIC | Age: 65
End: 2025-04-29
Attending: OPHTHALMOLOGY
Payer: COMMERCIAL

## 2025-04-29 DIAGNOSIS — H35.50 RETINAL DYSTROPHY: ICD-10-CM

## 2025-04-29 PROCEDURE — 97166 OT EVAL MOD COMPLEX 45 MIN: CPT | Mod: GO | Performed by: OCCUPATIONAL THERAPIST

## 2025-04-29 PROCEDURE — 97535 SELF CARE MNGMENT TRAINING: CPT | Mod: GO | Performed by: OCCUPATIONAL THERAPIST

## 2025-04-30 ENCOUNTER — LAB (OUTPATIENT)
Dept: LAB | Facility: CLINIC | Age: 65
End: 2025-04-30
Payer: COMMERCIAL

## 2025-04-30 DIAGNOSIS — E83.42 HYPOMAGNESEMIA: ICD-10-CM

## 2025-04-30 DIAGNOSIS — H35.50 RETINAL DYSTROPHY: ICD-10-CM

## 2025-04-30 LAB — MAGNESIUM SERPL-MCNC: 1.5 MG/DL (ref 1.7–2.3)

## 2025-04-30 PROCEDURE — 36415 COLL VENOUS BLD VENIPUNCTURE: CPT

## 2025-04-30 PROCEDURE — 83735 ASSAY OF MAGNESIUM: CPT

## 2025-05-01 ENCOUNTER — TELEPHONE (OUTPATIENT)
Dept: FAMILY MEDICINE | Facility: CLINIC | Age: 65
End: 2025-05-01
Payer: COMMERCIAL

## 2025-05-02 PROBLEM — F32.9 MAJOR DEPRESSION, CHRONIC: Status: ACTIVE | Noted: 2025-05-02

## 2025-05-02 PROBLEM — H35.50 RETINAL DYSTROPHY: Status: ACTIVE | Noted: 2025-05-02

## 2025-05-02 PROBLEM — F33.1 MODERATE RECURRENT MAJOR DEPRESSION (H): Status: ACTIVE | Noted: 2025-05-02

## 2025-05-02 PROBLEM — J44.9 CHRONIC OBSTRUCTIVE PULMONARY DISEASE, UNSPECIFIED COPD TYPE (H): Status: ACTIVE | Noted: 2025-05-02

## 2025-05-02 PROBLEM — F33.1 MODERATE RECURRENT MAJOR DEPRESSION (H): Status: RESOLVED | Noted: 2025-05-02 | Resolved: 2025-05-02

## 2025-05-30 ENCOUNTER — THERAPY VISIT (OUTPATIENT)
Dept: OCCUPATIONAL THERAPY | Facility: CLINIC | Age: 65
End: 2025-05-30
Payer: COMMERCIAL

## 2025-05-30 DIAGNOSIS — H35.50 RETINAL DYSTROPHY: Primary | ICD-10-CM

## 2025-05-30 PROCEDURE — 97535 SELF CARE MNGMENT TRAINING: CPT | Mod: GO | Performed by: OCCUPATIONAL THERAPIST

## 2025-06-09 ENCOUNTER — PATIENT OUTREACH (OUTPATIENT)
Dept: CARE COORDINATION | Facility: CLINIC | Age: 65
End: 2025-06-09
Payer: COMMERCIAL

## 2025-06-09 NOTE — PROGRESS NOTES
Social Work - Intervention  [facility]  Data/Intervention:    Patient Name: Cecil Haddad Goes By: Cecil MEDRANOB/Age: 1960 (64 year old)     Visit Type: telephone  Referral Source: Ophthalmology   Reason for Referral: Vision loss resources and financial     Collaborated With:    -Patient, Cecil     Psychosocial Information/Concerns:  Cecil Is working with someone over with [place] services [employer] the [employer] to get low vision reader devices. He is also in the process of getting approved for an Alternative Care Program which will significantly help him in the long run with coverage for services. In the meantime, Cecil is worried about getting booted onto Medicare and affording his Part B Premium.      Intervention/Education/Resources Provided:  Spoke to Cecil about the Medicare Savings Program and based on estimated income, he should be eligible for the program to get his Part B paid back each month.      Assessment/Plan:  Per pt request, [employer] form mailed to Cecil to complete and submit.  will remain available for support as needed.      Provided patient/family with contact information and availability.    Radha Ivan WILMA  Medical Social Worker    [facility]     [address] [place] [id number]  [email or web address]  Gender pronouns: she/her   Employed by [employer]

## (undated) RX ORDER — ACETAMINOPHEN 325 MG/1
TABLET ORAL
Status: DISPENSED
Start: 2024-10-18

## (undated) RX ORDER — ACETAMINOPHEN 325 MG/1
TABLET ORAL
Status: DISPENSED
Start: 2024-11-01

## (undated) RX ORDER — FENTANYL CITRATE 50 UG/ML
INJECTION, SOLUTION INTRAMUSCULAR; INTRAVENOUS
Status: DISPENSED
Start: 2024-10-18

## (undated) RX ORDER — TETRACAINE HYDROCHLORIDE 5 MG/ML
SOLUTION OPHTHALMIC
Status: DISPENSED
Start: 2024-10-18